# Patient Record
Sex: MALE | Race: WHITE | NOT HISPANIC OR LATINO | Employment: FULL TIME | URBAN - METROPOLITAN AREA
[De-identification: names, ages, dates, MRNs, and addresses within clinical notes are randomized per-mention and may not be internally consistent; named-entity substitution may affect disease eponyms.]

---

## 2017-08-11 RX ORDER — FEXOFENADINE HCL 180 MG/1
180 TABLET ORAL DAILY
COMMUNITY
End: 2020-06-16 | Stop reason: HOSPADM

## 2017-08-11 RX ORDER — IBUPROFEN 200 MG
200 TABLET ORAL EVERY 6 HOURS PRN
COMMUNITY

## 2017-08-11 RX ORDER — NAPROXEN 250 MG/1
250 TABLET ORAL 2 TIMES DAILY PRN
COMMUNITY
End: 2017-09-01

## 2017-08-11 RX ORDER — LISINOPRIL AND HYDROCHLOROTHIAZIDE 20; 12.5 MG/1; MG/1
1 TABLET ORAL DAILY
COMMUNITY
Start: 2014-11-07 | End: 2020-12-03 | Stop reason: DRUGHIGH

## 2017-08-11 RX ORDER — PANTOPRAZOLE SODIUM 40 MG/1
40 GRANULE, DELAYED RELEASE ORAL
COMMUNITY
Start: 2014-11-07 | End: 2022-07-18 | Stop reason: SDUPTHER

## 2017-08-11 RX ORDER — SIMVASTATIN 40 MG
40 TABLET ORAL DAILY
COMMUNITY

## 2017-08-11 RX ORDER — METHSCOPOLAMINE BROMIDE 5 MG/1
2.5 TABLET ORAL DAILY PRN
COMMUNITY
Start: 2014-11-07

## 2017-08-15 ENCOUNTER — OFFICE VISIT (OUTPATIENT)
Dept: NEUROSURGERY | Facility: CLINIC | Age: 52
End: 2017-08-15

## 2017-08-15 VITALS
BODY MASS INDEX: 35.16 KG/M2 | DIASTOLIC BLOOD PRESSURE: 73 MMHG | HEART RATE: 87 BPM | WEIGHT: 232 LBS | HEIGHT: 68 IN | SYSTOLIC BLOOD PRESSURE: 114 MMHG

## 2017-08-15 DIAGNOSIS — M54.50 LUMBAR SPINE PAIN: ICD-10-CM

## 2017-08-15 DIAGNOSIS — M54.16 BILATERAL LUMBAR RADICULOPATHY: Primary | ICD-10-CM

## 2017-08-15 PROCEDURE — 99213 OFFICE O/P EST LOW 20 MIN: CPT | Performed by: NURSE PRACTITIONER

## 2017-08-15 NOTE — PROGRESS NOTES
Subjective   Patient ID: Enrike Price is a 51 y.o. male is here today for follow-up bilateral leg pain. He states that the right side is worse than the left. Patient has been going to physical therapy once a week. He has just finished a MDP and states that it helped with the pain for a while. He is currently taking Advil 200 mg PRN and Naproxen 250 mg PRN for pain.    Leg Pain    The incident occurred more than 1 week ago. There was no injury mechanism. The pain is present in the left hip, left thigh, right hip, right thigh and right knee (right and left buttock and calf). The pain is at a severity of 6/10. The pain is moderate. The pain has been fluctuating since onset. Associated symptoms include numbness (intermittent) and tingling. Exacerbated by: walking, lying down. Treatments tried: PT, MDP. The treatment provided mild relief.       The following portions of the patient's history were reviewed and updated as appropriate: allergies, current medications, past family history, past medical history, past social history, past surgical history and problem list.    Review of Systems   Constitutional: Positive for activity change.   Musculoskeletal: Positive for arthralgias and back pain.   Neurological: Positive for tingling and numbness (intermittent).   All other systems reviewed and are negative.      Objective   Physical Exam   Constitutional: He is oriented to person, place, and time. He appears well-developed and well-nourished. He is cooperative. No distress.   HENT:   Head: Normocephalic and atraumatic.   Eyes: Conjunctivae and EOM are normal. Pupils are equal, round, and reactive to light.   Neck: Normal range of motion. Neck supple.   Cardiovascular: Normal rate and regular rhythm.    Pulmonary/Chest: Effort normal. No respiratory distress.   Abdominal: Soft. He exhibits no distension. There is no tenderness.   Musculoskeletal: Normal range of motion. He exhibits no edema or tenderness.   Neurological:  He is alert and oriented to person, place, and time. He has normal strength and normal reflexes. He displays no atrophy and normal reflexes. No sensory deficit. Coordination (mild difficulty with heel walkingon the right) abnormal. GCS eye subscore is 4. GCS verbal subscore is 5. GCS motor subscore is 6.   Reflex Scores:       Tricep reflexes are 2+ on the right side and 2+ on the left side.       Bicep reflexes are 2+ on the right side and 2+ on the left side.       Brachioradialis reflexes are 2+ on the right side and 2+ on the left side.       Patellar reflexes are 2+ on the right side and 2+ on the left side.       Achilles reflexes are 2+ on the right side and 2+ on the left side.  Negative Patel's; negative clonus.  Straight leg raise positive on the right at 30°.   Skin: Skin is warm and dry. No rash noted. He is not diaphoretic.   Psychiatric: He has a normal mood and affect. Thought content normal.   Vitals reviewed.    Neurologic Exam     Mental Status   Oriented to person, place, and time.     Cranial Nerves     CN III, IV, VI   Pupils are equal, round, and reactive to light.  Extraocular motions are normal.     Motor Exam     Strength   Strength 5/5 throughout.     Gait, Coordination, and Reflexes     Reflexes   Right brachioradialis: 2+  Left brachioradialis: 2+  Right biceps: 2+  Left biceps: 2+  Right triceps: 2+  Left triceps: 2+  Right patellar: 2+  Left patellar: 2+  Right achilles: 2+  Left achilles: 2+      Assessment/Plan   Independent Review of Radiographic Studies:      I reviewed an old MRI of the lumbar spine performed in November 2015 today in the office.  The MRI showed multilevel degenerative changes with a central disc osteophyte complex at L4-5 contributing to bilateral lateral recess narrowing.  There is also a mild central disc bulge at L5-S1.    Medical Decision Making:      Mr. Price returns to the office for the above complaints.  He is been noticing increasing bilateral low  back pain with right greater than left buttock pain and posterior thigh/calf pain.  He has tried a Medrol Dosepak and physical therapy with no relief in symptoms.    Given the history and the findings on a previous lumbar MRI performed nearly 2 years ago, I think a new lumbar MRI is warranted.   will return to the office thereafter for re-evaluation.      Enrike was seen today for leg pain.    Diagnoses and all orders for this visit:    Bilateral lumbar radiculopathy  -     MRI Lumbar Spine Without Contrast; Future    Lumbar spine pain  -     MRI Lumbar Spine Without Contrast; Future      Return for after radiographic imaging.

## 2017-08-24 ENCOUNTER — APPOINTMENT (OUTPATIENT)
Dept: MRI IMAGING | Facility: HOSPITAL | Age: 52
End: 2017-08-24

## 2017-08-29 ENCOUNTER — HOSPITAL ENCOUNTER (OUTPATIENT)
Dept: MRI IMAGING | Facility: HOSPITAL | Age: 52
Discharge: HOME | End: 2017-08-29
Admitting: NURSE PRACTITIONER

## 2017-08-29 DIAGNOSIS — M54.50 LUMBAR SPINE PAIN: ICD-10-CM

## 2017-08-29 DIAGNOSIS — M54.16 BILATERAL LUMBAR RADICULOPATHY: ICD-10-CM

## 2017-08-29 PROCEDURE — 72148 MRI LUMBAR SPINE W/O DYE: CPT

## 2017-08-31 ENCOUNTER — OFFICE VISIT (OUTPATIENT)
Dept: NEUROSURGERY | Facility: CLINIC | Age: 52
End: 2017-08-31

## 2017-08-31 VITALS
BODY MASS INDEX: 34.86 KG/M2 | WEIGHT: 230 LBS | DIASTOLIC BLOOD PRESSURE: 74 MMHG | HEART RATE: 99 BPM | HEIGHT: 68 IN | SYSTOLIC BLOOD PRESSURE: 132 MMHG

## 2017-08-31 DIAGNOSIS — M51.16 NEURITIS OR RADICULITIS DUE TO RUPTURE OF LUMBAR INTERVERTEBRAL DISC: Primary | ICD-10-CM

## 2017-08-31 PROCEDURE — 99213 OFFICE O/P EST LOW 20 MIN: CPT | Performed by: NEUROLOGICAL SURGERY

## 2017-08-31 RX ORDER — CEFAZOLIN SODIUM 2 G/100ML
2 INJECTION, SOLUTION INTRAVENOUS ONCE
Status: CANCELLED | OUTPATIENT
Start: 2017-09-13 | End: 2017-08-31

## 2017-09-01 ENCOUNTER — APPOINTMENT (OUTPATIENT)
Dept: PREADMISSION TESTING | Facility: HOSPITAL | Age: 52
End: 2017-09-01

## 2017-09-01 ENCOUNTER — TELEPHONE (OUTPATIENT)
Dept: NEUROSURGERY | Facility: CLINIC | Age: 52
End: 2017-09-01

## 2017-09-01 LAB
ANION GAP SERPL CALCULATED.3IONS-SCNC: 14.8 MMOL/L
BUN BLD-MCNC: 15 MG/DL (ref 6–20)
BUN/CREAT SERPL: 19 (ref 7–25)
CALCIUM SPEC-SCNC: 9.8 MG/DL (ref 8.6–10.5)
CHLORIDE SERPL-SCNC: 102 MMOL/L (ref 98–107)
CO2 SERPL-SCNC: 25.2 MMOL/L (ref 22–29)
CREAT BLD-MCNC: 0.79 MG/DL (ref 0.76–1.27)
DEPRECATED RDW RBC AUTO: 44.4 FL (ref 37–54)
ERYTHROCYTE [DISTWIDTH] IN BLOOD BY AUTOMATED COUNT: 13.6 % (ref 11.5–14.5)
GFR SERPL CREATININE-BSD FRML MDRD: 104 ML/MIN/1.73
GLUCOSE BLD-MCNC: 93 MG/DL (ref 65–99)
HCT VFR BLD AUTO: 43.8 % (ref 40.4–52.2)
HGB BLD-MCNC: 14.4 G/DL (ref 13.7–17.6)
MCH RBC QN AUTO: 29.9 PG (ref 27–32.7)
MCHC RBC AUTO-ENTMCNC: 32.9 G/DL (ref 32.6–36.4)
MCV RBC AUTO: 90.9 FL (ref 79.8–96.2)
PLATELET # BLD AUTO: 232 10*3/MM3 (ref 140–500)
PMV BLD AUTO: 10.6 FL (ref 6–12)
POTASSIUM BLD-SCNC: 4.2 MMOL/L (ref 3.5–5.2)
RBC # BLD AUTO: 4.82 10*6/MM3 (ref 4.6–6)
SODIUM BLD-SCNC: 142 MMOL/L (ref 136–145)
WBC NRBC COR # BLD: 6.96 10*3/MM3 (ref 4.5–10.7)

## 2017-09-01 PROCEDURE — 93010 ELECTROCARDIOGRAM REPORT: CPT | Performed by: INTERNAL MEDICINE

## 2017-09-01 PROCEDURE — 85027 COMPLETE CBC AUTOMATED: CPT | Performed by: NEUROLOGICAL SURGERY

## 2017-09-01 PROCEDURE — 93005 ELECTROCARDIOGRAM TRACING: CPT

## 2017-09-01 PROCEDURE — 36415 COLL VENOUS BLD VENIPUNCTURE: CPT

## 2017-09-01 PROCEDURE — 80048 BASIC METABOLIC PNL TOTAL CA: CPT | Performed by: NEUROLOGICAL SURGERY

## 2017-09-01 RX ORDER — METHYLPREDNISOLONE 4 MG/1
TABLET ORAL
Qty: 1 EACH | Refills: 0 | Status: ON HOLD | OUTPATIENT
Start: 2017-09-01 | End: 2017-09-13

## 2017-09-01 NOTE — DISCHARGE INSTRUCTIONS
Take the following medications the morning of surgery with a small sip of water:  NONE    ARRIVE TO MAIN OR AT 7:30      General Instructions:  • Do not eat solid food after midnight the night before surgery.  • You may drink clear liquids day of surgery but must stop at least one hour before your hospital arrival time.  • It is beneficial for you to have a clear drink that contains carbohydrates the day of surgery.  We suggest a 20 ounce bottle of Gatorade or Powerade for non-diabetic patients or a 20 ounce bottle of G2 or Powerade Zero for diabetic patients. (Pediatric patients, are not advised to drink a 20 ounce carbohydrate drink)    Clear liquids are liquids you can see through.  Nothing red in color.     Plain water                               Sports drinks  Sodas                                   Gelatin (Jell-O)  Fruit juices without pulp such as white grape juice and apple juice  Popsicles that contain no fruit or yogurt  Tea or coffee (no cream or milk added)  Gatorade / Powerade  G2 / Powerade Zero    • Infants may have breast milk up to four hours before surgery.  • Infants drinking formula may drink formula up to six hours before surgery.   • Patients who avoid smoking, chewing tobacco and alcohol for 4 weeks prior to surgery have a reduced risk of post-operative complications.  Quit smoking as many days before surgery as you can.  • Do not smoke, use chewing tobacco or drink alcohol the day of surgery.   • If applicable bring your C-PAP/ BI-PAP machine.  • Bring any papers given to you in the doctor’s office.  • Wear clean comfortable clothes and socks.  • Do not wear contact lenses or make-up.  Bring a case for your glasses.   • Bring crutches or walker if applicable.  • Leave all other valuables and jewelry at home.  • The Pre-Admission Testing nurse will instruct you to bring medications if unable to obtain an accurate list in Pre-Admission Testing.        If you were given a blood bank ID arm  band remember to bring it with you the day of surgery.    Preventing a Surgical Site Infection:  • For 2 to 3 days before surgery, avoid shaving with a razor because the razor can irritate skin and make it easier to develop an infection.  • The night prior to surgery sleep in a clean bed with clean clothing.  Do not allow pets to sleep with you.  • Shower on the morning of surgery using a fresh bar of anti-bacterial soap (such as Dial) and clean washcloth.  Dry with a clean towel and dress in clean clothing.  • Ask your surgeon if you will be receiving antibiotics prior to surgery.  • Make sure you, your family, and all healthcare providers clean their hands with soap and water or an alcohol based hand  before caring for you or your wound.    Day of surgery:  Upon arrival, a Pre-op nurse and Anesthesiologist will review your health history, obtain vital signs, and answer questions you may have.  The only belongings needed at this time will be your home medications and if applicable your C-PAP/BI-PAP machine.  If you are staying overnight your family can leave the rest of your belongings in the car and bring them to your room later.  A Pre-op nurse will start an IV and you may receive medication in preparation for surgery, including something to help you relax.  Your family will be able to see you in the Pre-op area.  While you are in surgery your family should notify the waiting room  if they leave the waiting room area and provide a contact phone number.    Please be aware that surgery does come with discomfort.  We want to make every effort to control your discomfort so please discuss any uncontrolled symptoms with your nurse.   Your doctor will most likely have prescribed pain medications.      If you are going home after surgery you will receive individualized written care instructions before being discharged.  A responsible adult must drive you to and from the hospital on the day of your  surgery and stay with you for 24 hours.    If you are staying overnight following surgery, you will be transported to your hospital room following the recovery period.  [facility] has all private rooms.    If you have any questions please call Pre-Admission Testing at [phone].  Deductibles and co-payments are collected on the day of service. Please be prepared to pay the required co-pay, deductible or deposit on the day of service as defined by your plan.

## 2017-09-01 NOTE — TELEPHONE ENCOUNTER
Patient called and wanted to know if he can get a MDP? He has some increased pain today. His surgery is scheduled for 9/11/2017

## 2017-09-13 ENCOUNTER — APPOINTMENT (OUTPATIENT)
Dept: GENERAL RADIOLOGY | Facility: HOSPITAL | Age: 52
End: 2017-09-13

## 2017-09-13 ENCOUNTER — ANESTHESIA (OUTPATIENT)
Dept: PERIOP | Facility: HOSPITAL | Age: 52
End: 2017-09-13

## 2017-09-13 ENCOUNTER — HOSPITAL ENCOUNTER (OUTPATIENT)
Facility: HOSPITAL | Age: 52
Setting detail: HOSPITAL OUTPATIENT SURGERY
Discharge: HOME | End: 2017-09-13
Attending: NEUROLOGICAL SURGERY | Admitting: NEUROLOGICAL SURGERY

## 2017-09-13 ENCOUNTER — ANESTHESIA EVENT (OUTPATIENT)
Dept: PERIOP | Facility: HOSPITAL | Age: 52
End: 2017-09-13

## 2017-09-13 VITALS
HEIGHT: 68 IN | DIASTOLIC BLOOD PRESSURE: 86 MMHG | BODY MASS INDEX: 35.54 KG/M2 | SYSTOLIC BLOOD PRESSURE: 120 MMHG | HEART RATE: 90 BPM | WEIGHT: 234.5 LBS | OXYGEN SATURATION: 95 % | RESPIRATION RATE: 18 BRPM | TEMPERATURE: 98.7 F

## 2017-09-13 DIAGNOSIS — M51.16 NEURITIS OR RADICULITIS DUE TO RUPTURE OF LUMBAR INTERVERTEBRAL DISC: ICD-10-CM

## 2017-09-13 PROCEDURE — 25010000003 CEFAZOLIN IN DEXTROSE 2-4 GM/100ML-% SOLUTION: Performed by: NEUROLOGICAL SURGERY

## 2017-09-13 PROCEDURE — 76000 FLUOROSCOPY <1 HR PHYS/QHP: CPT

## 2017-09-13 PROCEDURE — 63030 LAMOT DCMPRN NRV RT 1 LMBR: CPT | Performed by: NEUROLOGICAL SURGERY

## 2017-09-13 PROCEDURE — 25010000002 FENTANYL CITRATE (PF) 100 MCG/2ML SOLUTION: Performed by: NURSE ANESTHETIST, CERTIFIED REGISTERED

## 2017-09-13 PROCEDURE — 25010000002 HYDROMORPHONE PER 4 MG: Performed by: NURSE ANESTHETIST, CERTIFIED REGISTERED

## 2017-09-13 PROCEDURE — 25010000003 CEFAZOLIN IN D5W 1 GM/50ML SOLUTION: Performed by: NURSE ANESTHETIST, CERTIFIED REGISTERED

## 2017-09-13 PROCEDURE — 25010000002 ONDANSETRON PER 1 MG: Performed by: NURSE ANESTHETIST, CERTIFIED REGISTERED

## 2017-09-13 PROCEDURE — 25010000002 MIDAZOLAM PER 1 MG: Performed by: ANESTHESIOLOGY

## 2017-09-13 PROCEDURE — 25010000002 PROPOFOL 10 MG/ML EMULSION: Performed by: NURSE ANESTHETIST, CERTIFIED REGISTERED

## 2017-09-13 PROCEDURE — 72100 X-RAY EXAM L-S SPINE 2/3 VWS: CPT

## 2017-09-13 PROCEDURE — 25010000002 DEXAMETHASONE PER 1 MG: Performed by: NURSE ANESTHETIST, CERTIFIED REGISTERED

## 2017-09-13 RX ORDER — EPHEDRINE SULFATE 50 MG/ML
5 INJECTION, SOLUTION INTRAVENOUS ONCE AS NEEDED
Status: DISCONTINUED | OUTPATIENT
Start: 2017-09-13 | End: 2017-09-13 | Stop reason: HOSPADM

## 2017-09-13 RX ORDER — MIDAZOLAM HYDROCHLORIDE 1 MG/ML
1 INJECTION INTRAMUSCULAR; INTRAVENOUS
Status: DISCONTINUED | OUTPATIENT
Start: 2017-09-13 | End: 2017-09-13 | Stop reason: HOSPADM

## 2017-09-13 RX ORDER — DIPHENHYDRAMINE HYDROCHLORIDE 50 MG/ML
12.5 INJECTION INTRAMUSCULAR; INTRAVENOUS
Status: DISCONTINUED | OUTPATIENT
Start: 2017-09-13 | End: 2017-09-13 | Stop reason: HOSPADM

## 2017-09-13 RX ORDER — PROMETHAZINE HYDROCHLORIDE 25 MG/1
25 TABLET ORAL ONCE AS NEEDED
Status: DISCONTINUED | OUTPATIENT
Start: 2017-09-13 | End: 2017-09-13 | Stop reason: HOSPADM

## 2017-09-13 RX ORDER — DEXAMETHASONE SODIUM PHOSPHATE 10 MG/ML
INJECTION INTRAMUSCULAR; INTRAVENOUS AS NEEDED
Status: DISCONTINUED | OUTPATIENT
Start: 2017-09-13 | End: 2017-09-13 | Stop reason: SURG

## 2017-09-13 RX ORDER — PROMETHAZINE HYDROCHLORIDE 25 MG/1
25 SUPPOSITORY RECTAL ONCE AS NEEDED
Status: DISCONTINUED | OUTPATIENT
Start: 2017-09-13 | End: 2017-09-13 | Stop reason: HOSPADM

## 2017-09-13 RX ORDER — PROMETHAZINE HYDROCHLORIDE 25 MG/ML
12.5 INJECTION, SOLUTION INTRAMUSCULAR; INTRAVENOUS ONCE AS NEEDED
Status: DISCONTINUED | OUTPATIENT
Start: 2017-09-13 | End: 2017-09-13 | Stop reason: HOSPADM

## 2017-09-13 RX ORDER — FAMOTIDINE 10 MG/ML
20 INJECTION, SOLUTION INTRAVENOUS ONCE
Status: COMPLETED | OUTPATIENT
Start: 2017-09-13 | End: 2017-09-13

## 2017-09-13 RX ORDER — HYDROMORPHONE HYDROCHLORIDE 1 MG/ML
0.5 INJECTION, SOLUTION INTRAMUSCULAR; INTRAVENOUS; SUBCUTANEOUS
Status: DISCONTINUED | OUTPATIENT
Start: 2017-09-13 | End: 2017-09-13 | Stop reason: HOSPADM

## 2017-09-13 RX ORDER — PROPOFOL 10 MG/ML
VIAL (ML) INTRAVENOUS AS NEEDED
Status: DISCONTINUED | OUTPATIENT
Start: 2017-09-13 | End: 2017-09-13 | Stop reason: SURG

## 2017-09-13 RX ORDER — OXYCODONE AND ACETAMINOPHEN 7.5; 325 MG/1; MG/1
1 TABLET ORAL ONCE AS NEEDED
Status: DISCONTINUED | OUTPATIENT
Start: 2017-09-13 | End: 2017-09-13 | Stop reason: HOSPADM

## 2017-09-13 RX ORDER — HYDROCODONE BITARTRATE AND ACETAMINOPHEN 5; 325 MG/1; MG/1
1 TABLET ORAL EVERY 4 HOURS PRN
Qty: 35 TABLET | Refills: 0
Start: 2017-09-13 | End: 2017-09-28 | Stop reason: SDUPTHER

## 2017-09-13 RX ORDER — LIDOCAINE HYDROCHLORIDE 20 MG/ML
INJECTION, SOLUTION INFILTRATION; PERINEURAL AS NEEDED
Status: DISCONTINUED | OUTPATIENT
Start: 2017-09-13 | End: 2017-09-13 | Stop reason: SURG

## 2017-09-13 RX ORDER — MIDAZOLAM HYDROCHLORIDE 1 MG/ML
2 INJECTION INTRAMUSCULAR; INTRAVENOUS
Status: DISCONTINUED | OUTPATIENT
Start: 2017-09-13 | End: 2017-09-13 | Stop reason: HOSPADM

## 2017-09-13 RX ORDER — HYDROCODONE BITARTRATE AND ACETAMINOPHEN 7.5; 325 MG/1; MG/1
1 TABLET ORAL ONCE AS NEEDED
Status: COMPLETED | OUTPATIENT
Start: 2017-09-13 | End: 2017-09-13

## 2017-09-13 RX ORDER — ONDANSETRON 2 MG/ML
4 INJECTION INTRAMUSCULAR; INTRAVENOUS ONCE AS NEEDED
Status: DISCONTINUED | OUTPATIENT
Start: 2017-09-13 | End: 2017-09-13 | Stop reason: HOSPADM

## 2017-09-13 RX ORDER — PROMETHAZINE HYDROCHLORIDE 25 MG/1
12.5 TABLET ORAL ONCE AS NEEDED
Status: DISCONTINUED | OUTPATIENT
Start: 2017-09-13 | End: 2017-09-13 | Stop reason: HOSPADM

## 2017-09-13 RX ORDER — CEFAZOLIN SODIUM 2 G/100ML
2 INJECTION, SOLUTION INTRAVENOUS ONCE
Status: COMPLETED | OUTPATIENT
Start: 2017-09-13 | End: 2017-09-13

## 2017-09-13 RX ORDER — SCOLOPAMINE TRANSDERMAL SYSTEM 1 MG/1
1 PATCH, EXTENDED RELEASE TRANSDERMAL ONCE
Status: DISCONTINUED | OUTPATIENT
Start: 2017-09-13 | End: 2017-09-13 | Stop reason: HOSPADM

## 2017-09-13 RX ORDER — LABETALOL HYDROCHLORIDE 5 MG/ML
5 INJECTION, SOLUTION INTRAVENOUS
Status: DISCONTINUED | OUTPATIENT
Start: 2017-09-13 | End: 2017-09-13 | Stop reason: HOSPADM

## 2017-09-13 RX ORDER — HYDROCODONE BITARTRATE AND ACETAMINOPHEN 5; 325 MG/1; MG/1
1 TABLET ORAL ONCE AS NEEDED
Status: DISCONTINUED | OUTPATIENT
Start: 2017-09-13 | End: 2017-09-13 | Stop reason: HOSPADM

## 2017-09-13 RX ORDER — SODIUM CHLORIDE 0.9 % (FLUSH) 0.9 %
1-10 SYRINGE (ML) INJECTION AS NEEDED
Status: DISCONTINUED | OUTPATIENT
Start: 2017-09-13 | End: 2017-09-13 | Stop reason: HOSPADM

## 2017-09-13 RX ORDER — FENTANYL CITRATE 50 UG/ML
50 INJECTION, SOLUTION INTRAMUSCULAR; INTRAVENOUS
Status: DISCONTINUED | OUTPATIENT
Start: 2017-09-13 | End: 2017-09-13 | Stop reason: HOSPADM

## 2017-09-13 RX ORDER — FENTANYL CITRATE 50 UG/ML
INJECTION, SOLUTION INTRAMUSCULAR; INTRAVENOUS AS NEEDED
Status: DISCONTINUED | OUTPATIENT
Start: 2017-09-13 | End: 2017-09-13 | Stop reason: SURG

## 2017-09-13 RX ORDER — ROCURONIUM BROMIDE 10 MG/ML
INJECTION, SOLUTION INTRAVENOUS AS NEEDED
Status: DISCONTINUED | OUTPATIENT
Start: 2017-09-13 | End: 2017-09-13 | Stop reason: SURG

## 2017-09-13 RX ORDER — SODIUM CHLORIDE, SODIUM LACTATE, POTASSIUM CHLORIDE, CALCIUM CHLORIDE 600; 310; 30; 20 MG/100ML; MG/100ML; MG/100ML; MG/100ML
9 INJECTION, SOLUTION INTRAVENOUS CONTINUOUS
Status: DISCONTINUED | OUTPATIENT
Start: 2017-09-13 | End: 2017-09-13 | Stop reason: HOSPADM

## 2017-09-13 RX ORDER — ONDANSETRON 2 MG/ML
INJECTION INTRAMUSCULAR; INTRAVENOUS AS NEEDED
Status: DISCONTINUED | OUTPATIENT
Start: 2017-09-13 | End: 2017-09-13 | Stop reason: SURG

## 2017-09-13 RX ADMIN — CEFAZOLIN SODIUM 2 G: 2 INJECTION, SOLUTION INTRAVENOUS at 08:15

## 2017-09-13 RX ADMIN — LIDOCAINE HYDROCHLORIDE 60 MG: 20 INJECTION, SOLUTION INFILTRATION; PERINEURAL at 08:08

## 2017-09-13 RX ADMIN — FENTANYL CITRATE 100 MCG: 50 INJECTION INTRAMUSCULAR; INTRAVENOUS at 08:08

## 2017-09-13 RX ADMIN — SUGAMMADEX 200 MG: 100 INJECTION, SOLUTION INTRAVENOUS at 09:36

## 2017-09-13 RX ADMIN — ONDANSETRON 4 MG: 2 INJECTION INTRAMUSCULAR; INTRAVENOUS at 08:37

## 2017-09-13 RX ADMIN — SODIUM CHLORIDE, POTASSIUM CHLORIDE, SODIUM LACTATE AND CALCIUM CHLORIDE 9 ML/HR: 600; 310; 30; 20 INJECTION, SOLUTION INTRAVENOUS at 07:01

## 2017-09-13 RX ADMIN — HYDROMORPHONE HYDROCHLORIDE 0.5 MG: 1 INJECTION, SOLUTION INTRAMUSCULAR; INTRAVENOUS; SUBCUTANEOUS at 10:53

## 2017-09-13 RX ADMIN — FENTANYL CITRATE 50 MCG: 50 INJECTION INTRAMUSCULAR; INTRAVENOUS at 08:20

## 2017-09-13 RX ADMIN — FENTANYL CITRATE 50 MCG: 50 INJECTION INTRAMUSCULAR; INTRAVENOUS at 10:35

## 2017-09-13 RX ADMIN — MIDAZOLAM 2 MG: 1 INJECTION INTRAMUSCULAR; INTRAVENOUS at 07:02

## 2017-09-13 RX ADMIN — FENTANYL CITRATE 50 MCG: 50 INJECTION INTRAMUSCULAR; INTRAVENOUS at 09:44

## 2017-09-13 RX ADMIN — DEXAMETHASONE SODIUM PHOSPHATE 8 MG: 10 INJECTION INTRAMUSCULAR; INTRAVENOUS at 08:36

## 2017-09-13 RX ADMIN — CEFAZOLIN SODIUM 1 G: 1 INJECTION, SOLUTION INTRAVENOUS at 09:20

## 2017-09-13 RX ADMIN — HYDROMORPHONE HYDROCHLORIDE 0.5 MG: 1 INJECTION, SOLUTION INTRAMUSCULAR; INTRAVENOUS; SUBCUTANEOUS at 10:20

## 2017-09-13 RX ADMIN — SODIUM CHLORIDE, POTASSIUM CHLORIDE, SODIUM LACTATE AND CALCIUM CHLORIDE: 600; 310; 30; 20 INJECTION, SOLUTION INTRAVENOUS at 09:40

## 2017-09-13 RX ADMIN — PROPOFOL 150 MG: 10 INJECTION, EMULSION INTRAVENOUS at 08:08

## 2017-09-13 RX ADMIN — HYDROCODONE BITARTRATE AND ACETAMINOPHEN 1 TABLET: 7.5; 325 TABLET ORAL at 11:00

## 2017-09-13 RX ADMIN — ROCURONIUM BROMIDE 50 MG: 10 INJECTION INTRAVENOUS at 08:08

## 2017-09-13 RX ADMIN — FAMOTIDINE 20 MG: 10 INJECTION, SOLUTION INTRAVENOUS at 07:01

## 2017-09-13 RX ADMIN — FENTANYL CITRATE 50 MCG: 50 INJECTION INTRAMUSCULAR; INTRAVENOUS at 10:13

## 2017-09-13 RX ADMIN — SCOPALAMINE 1 PATCH: 1 PATCH, EXTENDED RELEASE TRANSDERMAL at 07:01

## 2017-09-13 NOTE — ANESTHESIA PREPROCEDURE EVALUATION
Anesthesia Evaluation     Patient summary reviewed and Nursing notes reviewed   history of anesthetic complications: PONV  NPO Solid Status: > 8 hours  NPO Liquid Status: > 2 hours     Airway   Mallampati: III  no difficulty expected  Dental - normal exam     Comment: Upper 2 caps    Pulmonary - normal exam   Cardiovascular - normal exam    (+) hypertension,       Neuro/Psych  GI/Hepatic/Renal/Endo      Musculoskeletal     (+) back pain,   Abdominal    Substance History      OB/GYN          Other                                        Anesthesia Plan    ASA 2     general   (Risks of dental trauma discussed)  Anesthetic plan and risks discussed with patient and father.

## 2017-09-13 NOTE — INTERVAL H&P NOTE
H&P updated. The patient was examined and the following changes are noted:  He has been having some numbness in the left thigh recently but not severe.  I rec that we just stick with the plan.  Anything else will involve a much bigger surgery.

## 2017-09-13 NOTE — PLAN OF CARE
Problem: Perioperative Period (Adult)  Goal: Signs and Symptoms of Listed Potential Problems Will be Absent or Manageable (Perioperative Period)  Outcome: Outcome(s) achieved Date Met:  09/13/17 09/13/17 4756   Perioperative Period   Problems Present (Perioperative Period) none

## 2017-09-13 NOTE — PLAN OF CARE
Problem: Patient Care Overview (Adult)  Goal: Adult Individualization and Mutuality  Outcome: Outcome(s) achieved Date Met:  09/13/17

## 2017-09-13 NOTE — ANESTHESIA PROCEDURE NOTES
Airway  Urgency: elective    Airway not difficult    General Information and Staff    Patient location during procedure: OR  CRNA: MARIAN MACEDO    Indications and Patient Condition  Indications for airway management: airway protection    Preoxygenated: yes  Mask difficulty assessment: 1 - vent by mask    Final Airway Details  Final airway type: endotracheal airway      Successful airway: ETT  Cuffed: yes   Successful intubation technique: direct laryngoscopy  Facilitating devices/methods: anterior pressure/BURP  Endotracheal tube insertion site: oral  Blade: Mila  Blade size: #3  ETT size: 7.5 mm  Cormack-Lehane Classification: grade IIb - view of arytenoids or posterior of glottis only  Placement verified by: chest auscultation and capnometry   Measured from: lips  ETT to lips (cm): 22  Number of attempts at approach: 1    Additional Comments   ett cuff up at MOP

## 2017-09-13 NOTE — DISCHARGE INSTRUCTIONS
Remove the Scopolamine patch from behind your ear tomorrow.      You had one Norco for pain at 11:00 AM.      LUMBAR SURGERY - DAYRON RIVERA M.D.  [address], [address]  [place] [address]  [phone]    Instructions & Care After Your Lumbar Surgery    1. No sitting except on the commode.  You may lie on a firm couch but not on a waterbed or recliner.  You may lie in any position that is comfortable, using only one pillow under your head. Either stand at a counter or lie on your side for meals. Three weeks after surgery you may begin sitting for 30 minutes 3 times per day.    2. No driving for three weeks.  You may ride in the car in a passenger seat that reclines or lying down in the back seat.      3. No bending. If you drop something allow someone else to pick it up.    4. Don’t lift anything heavier than a coffee cup or paperback book.    5. Gradually increase your activity each day.  You should get out of bed every hour during the day.  Walk outside as soon as you feel up to it.  Walk short distances frequently rather than making a long trip.  Frequency is more important than distance.  Your goal is to be walking 2 to 3 miles per day when you return for your post-operative visit. (Never do this in one trip.)    6. You may climb stairs.    7. Remove your bandage the second day after surgery and leave it off.  If you notice any redness, swelling or drainage, call the office.  There are steri-strips across the incision.  If these are still present ten days after surgery, remove them gently.      8. You may shower five days after surgery.  Keep the incision dry until then.  Don’t let the water beat directly on the incision and gently pat it dry.    9. Physical Therapy will be arranged at your post-operative visit if needed.    10. Your prescription for pain medication may be filled for half the original amount prior to your return office visit.  Due to changes in Federal Law in order to have this  medication refilled you must contact the office four days prior to the date and make arrangements to pick the prescription up in the office.  This prescription refill cannot be called in to the pharmacy. Your prescription will be ready for pick-up the day the refill is due.    Don’t be alarmed if you experience some of your pre-operative symptoms after going home.  This is not uncommon and normally goes away in a few days but may last longer.  If you have any questions or concerns, please call our office.

## 2017-09-13 NOTE — PLAN OF CARE
Problem: Patient Care Overview (Adult)  Goal: Plan of Care Review  Outcome: Outcome(s) achieved Date Met:  09/13/17 09/13/17 8822   Patient Care Overview   Progress improving   Outcome Evaluation   Outcome Summary/Follow up Plan READY FOR DISCHARGE

## 2017-09-13 NOTE — OP NOTE
Preop diagnosis: Herniated disc on the right at L3 4    Postop diagnosis: same    Procedure performed:  L3 4 laminectomy, foraminotomy, medial facetectomy and discectomy using minimal access spinal technologies microsurgical technique microsurgical instrumentation    Surgeon: Eric Dickson M.D.    Assistant: Estefani Carrillo CFA who was instrumental in helping with visualization of neural structures, hemostasis, and retraction of neural structures.    Indications for the procedure:  This is a patient with severe pain in the legs.  Previous imaging had shown neural compression at the L3 4 levels.  As a result of this and the failure of conservative therapy the patient elected to proceed with surgery.    Operative summary:  After induction of general anesthesia the patient was intubated and placed on the operating table in the prone position on a Shaq table.  All pressure points were padded including peripheral points of entrapment.  The back was prepped with Chloraprep and then draped with Ioban, half sheets, and a split sheet.      The L3 4 level was localized with intraoperative fluoroscopy an incision was made on the right just above the pedicle.  Successive dilating tubes up to 18 mm by 6 cm were placed over that area.  Soft tissue was then removed from the supralaminar space.  The inferior laminar arch of L3 as well as the superior laminar arch of L4 and the medial aspect of facet were removed with the [employer] drill.  The remainder of the operation was done under high-power magnification of the operating microscope using microsurgical technique and microsurgical instrumentation.  The ligamentum flavum was opened and removed out to the level of the pedicle using the Kerrison rongeurs.  This exposed the lateral thecal sac and the nerve root of L4.  Once the lateral recess was opened the dissection was carried up to the L3 pedicle completely decompressing the superior nerve root.    Once this was done the L4  nerve root was mobilized medially to expose the disc.  There was evidence of a large disc herniation compressing the nerve just under the nerve root.  This was carefully teased out and then the disc space was incised and disc material was removed with the down-biting cervical curettes and the pituitary rongeurs.  Was also some disc material that had herniated superiorly and was compressing the L3 nerve root in the foramen which was also removed.  Once the disc space was emptied as much as possible bleeding was controlled with bipolar cautery.    Once the entire decompression was completed we were able to explore under the nerve root and the thecal sac using the Chin ball probe to be sure there was no evidence of residual compression.there being none bleeding was controlled again using the bipolar cautery, FloSeal, and thrombin and Gelfoam.  The area was then copiously irrigated with bacitracin solution and the tube was removed. The paraspinous musculature was injected with 100 cc 1/8% Marcaine with 1:200,000 epinephrine solution.    Another gram of Kefzol was given prior to closure.    The incision was then closed in layersdressed and the patient was taken to the recovery room in stable condition there were no apparent complications.  Sponge, instrument, and needle counts were correct at the end of the procedure.

## 2017-09-13 NOTE — PLAN OF CARE
Problem: Patient Care Overview (Adult)  Goal: Plan of Care Review  Outcome: Ongoing (interventions implemented as appropriate)    09/13/17 0637   Coping/Psychosocial Response Interventions   Plan Of Care Reviewed With patient   Patient Care Overview   Progress no change       Goal: Adult Individualization and Mutuality  Outcome: Ongoing (interventions implemented as appropriate)    09/13/17 0637   Individualization   Patient Specific Preferences call Mark   Patient Specific Goals be able to move and walk without pain   Patient Specific Interventions teach S&S of infection   Mutuality/Individual Preferences   What Anxieties, Fears or Concerns Do You Have About Your Health or Care? none   What Questions Do You Have About Your Health or Care? none   What Information Would Help Us Give You More Personalized Care? none       Goal: Discharge Needs Assessment  Outcome: Ongoing (interventions implemented as appropriate)    09/13/17 0631 09/13/17 0637   Discharge Needs Assessment   Concerns To Be Addressed --  denies needs/concerns at this time   Readmission Within The Last 30 Days --  no previous admission in last 30 days   Current Health   Anticipated Changes Related to Illness --  none   Self-Care   Equipment Currently Used at Home --  none   Living Environment   Transportation Available car;family or friend will provide --          Problem: Perioperative Period (Adult)  Goal: Signs and Symptoms of Listed Potential Problems Will be Absent or Manageable (Perioperative Period)  Outcome: Ongoing (interventions implemented as appropriate)    09/13/17 0637   Perioperative Period   Problems Assessed (Perioperative Period) pain;hypoxia/hypoxemia;situational response   Problems Present (Perioperative Period) none

## 2017-09-13 NOTE — H&P (VIEW-ONLY)
Subjective   Patient ID: Enrike Price is a 51 y.o. male is here today for follow-up with new Lumbar MRI ordered for back pain bilateral leg pain, with numbness and tingling in lower extremity's.    History of Present Illness    This patient returns today.  He has pain in his lower back with radiation into his right buttock and primarily into his right anterior thigh.  He does not have much pain on the left side at all.  He has no difficulty with bowel or bladder control or other associated symptoms.  He has no numbness or tingling in his perineum.    The following portions of the patient's history were reviewed and updated as appropriate: allergies, current medications, past family history, past medical history, past social history, past surgical history and problem list.    Review of Systems   Respiratory: Negative for chest tightness and shortness of breath.    Cardiovascular: Negative for chest pain.   Musculoskeletal: Positive for back pain.        Bilateral leg pain   Neurological: Positive for numbness.        Tingling   All other systems reviewed and are negative.      Objective   Physical Exam   Constitutional: He is oriented to person, place, and time. He appears well-developed and well-nourished.   Eyes: EOM are normal. Pupils are equal, round, and reactive to light.   Neurological: He is oriented to person, place, and time. He has a normal Finger-Nose-Finger Test and a normal Heel to Shin Test. Gait normal.   Reflex Scores:       Tricep reflexes are 2+ on the right side and 2+ on the left side.       Bicep reflexes are 2+ on the right side and 2+ on the left side.       Brachioradialis reflexes are 2+ on the right side and 2+ on the left side.       Patellar reflexes are 2+ on the right side and 2+ on the left side.       Achilles reflexes are 2+ on the right side and 2+ on the left side.  Psychiatric: His speech is normal.     Neurologic Exam     Mental Status   Oriented to person, place, and time.    Registration of memory: Good recent and remote memory.   Attention: normal. Concentration: normal.   Speech: speech is normal   Level of consciousness: alert  Knowledge: consistent with education.     Cranial Nerves     CN II   Visual fields full to confrontation.   Visual acuity: normal    CN III, IV, VI   Pupils are equal, round, and reactive to light.  Extraocular motions are normal.     CN V   Facial sensation intact.   Right corneal reflex: normal  Left corneal reflex: normal    CN VII   Facial expression full, symmetric.   Right facial weakness: none  Left facial weakness: none    CN VIII   Hearing: intact    CN IX, X   Palate: symmetric    CN XI   Right sternocleidomastoid strength: normal  Left sternocleidomastoid strength: normal    CN XII   Tongue: not atrophic  Tongue deviation: none    Motor Exam   Muscle bulk: normal  Right arm tone: normal  Left arm tone: normal  Right leg tone: normal  Left leg tone: normal    Strength   Strength 5/5 except as noted.     Sensory Exam   Light touch normal.     Gait, Coordination, and Reflexes     Gait  Gait: normal    Coordination   Finger to nose coordination: normal  Heel to shin coordination: normal    Reflexes   Right brachioradialis: 2+  Left brachioradialis: 2+  Right biceps: 2+  Left biceps: 2+  Right triceps: 2+  Left triceps: 2+  Right patellar: 2+  Left patellar: 2+  Right achilles: 2+  Left achilles: 2+  Right : 2+  Left : 2+      Assessment/Plan   Independent Review of Radiographic Studies:      I reviewed an MRI done on August 29 myself.  This does show a disc herniation to the right side into the neural foramen at L2-3.  It is probably causing some pressure on the nerve at that level.  There is a somewhat larger disc herniation to the right side and centrally at L3 4.  L4 5 shows some foraminal stenosis that is degenerative in nature and L5-S1 for the most part looks okay.    Medical Decision Making:      I told the patient and his wife about  the images.  I also showed him the films.  I told him that we could try epidural blocks or we could proceed with surgery.  I explained the advantages and disadvantages of each.  I told the patient about the risks, complications and expected outcome of the lumbar surgery.  I explained that there was an 80% chance of getting rid of the pain in the leg.  I explained that there would still be back pain after the surgery.  Initially this will be quite severe but will improve over time.  There is a 2 or 3% chance of infection, bleeding, CSF leak, damage to the nerve as a result of surgery, paralysis, as well as anesthetic risk.  There is a 5% chance of late instability which could require a fusion later on and a 10% chance of recurrent disc herniation.  We discussed the postoperative hospital and home course.    If he decides to proceed he will need to be scheduled for a: Right L3 4 laminectomy and discectomy with Metrix    Enrike was seen today for back pain and leg pain.    Diagnoses and all orders for this visit:    Neuritis or radiculitis due to rupture of lumbar intervertebral disc  -     Case Request; Standing  -     ceFAZolin (ANCEF) 2 g in sodium chloride 0.9 % 100 mL IVPB; Infuse 2 g into a venous catheter 1 (One) Time.  -     Case Request    Other orders  -     Follow anesthesia standing orders.  -     Obtain informed consent  -     Follow anesthesia standing orders.; Standing  -     LUIS hose- To be placed on patient in pre-op; Standing  -     SCD (sequential compression device)- to be placed on patient in Pre-op; Standing    Return for 2-3 week post op.

## 2017-09-13 NOTE — PLAN OF CARE
Problem: Patient Care Overview (Adult)  Goal: Discharge Needs Assessment  Outcome: Outcome(s) achieved Date Met:  09/13/17

## 2017-09-13 NOTE — BRIEF OP NOTE
LUMBAR DISCECTOMY POSTERIOR WITH METRIX  Procedure Note    Enrike Price  9/13/2017    Pre-op Diagnosis:   Neuritis or radiculitis due to rupture of lumbar intervertebral disc [M51.16]    Post-op Diagnosis:     Post-Op Diagnosis Codes:     * Neuritis or radiculitis due to rupture of lumbar intervertebral disc [M51.16]    Procedure/CPT® Codes:      Procedure(s):  Right L3 4 laminectomy and discectomy with Metrix    Surgeon(s):  Eric Dickson MD    Anesthesia: General    Staff:   Circulator: Roxanna Stovall RN; Rita Sanders RN  Scrub Person: Last Ayala  Assistant: Estefani Carrillo CSA    Estimated Blood Loss: 100 cc  Urine Voided: * No values recorded between 9/13/2017  7:55 AM and 9/13/2017  9:32 AM *    Specimens:                none      Drains:           Findings: large HNP    Complications: none      Eric Dickson MD     Date: 9/13/2017  Time: 9:32 AM

## 2017-09-13 NOTE — ANESTHESIA POSTPROCEDURE EVALUATION
"Patient: Enrike Price    Procedure Summary     Date Anesthesia Start Anesthesia Stop Room / Location    09/13/17 0800 [id number]  [facility] / [facility]       Procedure Diagnosis Surgeon Provider    Right L3 4 laminectomy and discectomy with Metrix (Right Spine Lumbar) Neuritis or radiculitis due to rupture of lumbar intervertebral disc  (Neuritis or radiculitis due to rupture of lumbar intervertebral disc [M51.16]) MD Radha Kruger MD          Anesthesia Type: general  Last vitals  BP   120/86 (09/13/17 1300)    Temp        Pulse   90 (09/13/17 1300)   Resp   18 (09/13/17 1300)    SpO2   95 % (09/13/17 1300)      Post Anesthesia Care and Evaluation    Patient location during evaluation: bedside  Patient participation: complete - patient participated  Level of consciousness: awake and alert  Pain management: adequate  Airway patency: patent  Anesthetic complications: No anesthetic complications    Cardiovascular status: acceptable  Respiratory status: acceptable  Hydration status: acceptable    Comments: /86  Pulse 90  Temp 37.1 °C (98.7 °F) (Oral)   Resp 18  Ht 68\" (172.7 cm)  Wt 234 lb 8 oz (106 kg)  SpO2 95%  BMI 35.66 kg/m2      "

## 2017-09-15 ENCOUNTER — TELEPHONE (OUTPATIENT)
Dept: NEUROSURGERY | Facility: CLINIC | Age: 52
End: 2017-09-15

## 2017-09-15 RX ORDER — CYCLOBENZAPRINE HCL 10 MG
10 TABLET ORAL 3 TIMES DAILY PRN
Qty: 40 TABLET | Refills: 0 | Status: SHIPPED | OUTPATIENT
Start: 2017-09-15 | End: 2017-11-07 | Stop reason: HOSPADM

## 2017-09-15 NOTE — TELEPHONE ENCOUNTER
Judith-      This pt called tonight with back pain. I told him he could double up on his norco Just for tonight. Please call in flexeril to his pharmacy tomorrow abs check in with him. He is aware he will not get a refill for the norco any earlier.   Leslie Abbott      Called the patient to advise him of the above from Leslie and to check on him. I told him that I sent the flexeril to his pharmacy. He said his left foot is slightly weaker than the right however the severe leg pain that he had before is gone he just has some slight numbness in his thigh. He also has some increased back pain. He said it is harder to get out of bed but once he is up he feels ok. I told him his back pain sounds ok and that he could have some increased pain than before surgery. He said he understands that it is a different pain and it should pass. He will walk as much as he can tolerate. He will call the office with any further problems or concerns

## 2017-09-19 ENCOUNTER — TELEPHONE (OUTPATIENT)
Dept: NEUROSURGERY | Facility: CLINIC | Age: 52
End: 2017-09-19

## 2017-09-25 ENCOUNTER — TELEPHONE (OUTPATIENT)
Dept: NEUROSURGERY | Facility: CLINIC | Age: 52
End: 2017-09-25

## 2017-09-25 DIAGNOSIS — Z09 FOLLOW-UP EXAMINATION FOLLOWING SURGERY: ICD-10-CM

## 2017-09-25 DIAGNOSIS — M51.16 NEURITIS OR RADICULITIS DUE TO RUPTURE OF LUMBAR INTERVERTEBRAL DISC: Primary | ICD-10-CM

## 2017-09-25 NOTE — TELEPHONE ENCOUNTER
"Pt called today regarding his pain. He states that he is having a \"shock feeling or spasms' that started after surgery and hasn't gone away. He also states that he has on rt side of the incision he had a constant ache. He wants to know if it Is normal and can we do something for him? He can be reached at the number listed.  "

## 2017-09-26 ENCOUNTER — HOSPITAL ENCOUNTER (OUTPATIENT)
Dept: GENERAL RADIOLOGY | Facility: HOSPITAL | Age: 52
Discharge: HOME | End: 2017-09-26
Attending: NEUROLOGICAL SURGERY | Admitting: NEUROLOGICAL SURGERY

## 2017-09-26 DIAGNOSIS — Z09 FOLLOW-UP EXAMINATION FOLLOWING SURGERY: ICD-10-CM

## 2017-09-26 DIAGNOSIS — M51.16 NEURITIS OR RADICULITIS DUE TO RUPTURE OF LUMBAR INTERVERTEBRAL DISC: ICD-10-CM

## 2017-09-26 PROCEDURE — 72114 X-RAY EXAM L-S SPINE BENDING: CPT

## 2017-09-26 RX ORDER — METHYLPREDNISOLONE 4 MG/1
TABLET ORAL
Qty: 1 EACH | Refills: 0 | Status: SHIPPED | OUTPATIENT
Start: 2017-09-26 | End: 2017-10-20

## 2017-09-26 NOTE — TELEPHONE ENCOUNTER
Correction to Dr. Dickson's note do lumbar XR, as the patient was an L3-4 Laminectomy. Patient is aware and will get the XR's

## 2017-09-28 ENCOUNTER — OFFICE VISIT (OUTPATIENT)
Dept: NEUROSURGERY | Facility: CLINIC | Age: 52
End: 2017-09-28

## 2017-09-28 VITALS
SYSTOLIC BLOOD PRESSURE: 120 MMHG | HEART RATE: 117 BPM | WEIGHT: 227.4 LBS | BODY MASS INDEX: 34.46 KG/M2 | HEIGHT: 68 IN | DIASTOLIC BLOOD PRESSURE: 78 MMHG

## 2017-09-28 DIAGNOSIS — Z09 FOLLOW-UP EXAMINATION FOLLOWING SURGERY: ICD-10-CM

## 2017-09-28 DIAGNOSIS — M54.16 LUMBAR RADICULITIS: Primary | ICD-10-CM

## 2017-09-28 PROCEDURE — 99024 POSTOP FOLLOW-UP VISIT: CPT | Performed by: NURSE PRACTITIONER

## 2017-09-28 RX ORDER — METHYLPREDNISOLONE 4 MG/1
TABLET ORAL
Qty: 1 TABLET | Refills: 0 | Status: SHIPPED | OUTPATIENT
Start: 2017-09-28 | End: 2017-10-20

## 2017-09-28 RX ORDER — GABAPENTIN 300 MG/1
300 CAPSULE ORAL 3 TIMES DAILY
Qty: 90 CAPSULE | Refills: 1 | Status: SHIPPED | OUTPATIENT
Start: 2017-09-28 | End: 2017-10-20 | Stop reason: DRUGHIGH

## 2017-09-28 RX ORDER — HYDROCODONE BITARTRATE AND ACETAMINOPHEN 5; 325 MG/1; MG/1
1 TABLET ORAL EVERY 6 HOURS PRN
Qty: 30 TABLET | Refills: 0 | Status: SHIPPED | OUTPATIENT
Start: 2017-09-28 | End: 2020-06-16

## 2017-09-28 RX ORDER — METHOCARBAMOL 500 MG/1
500 TABLET, FILM COATED ORAL 4 TIMES DAILY
Qty: 40 TABLET | Refills: 0 | Status: SHIPPED | OUTPATIENT
Start: 2017-09-28 | End: 2017-10-20 | Stop reason: SDUPTHER

## 2017-09-28 NOTE — PROGRESS NOTES
Subjective   Patient ID: Enrike Price is a 51 y.o. male is here today for follow-up. Post form 09/13/17 with Dr. Dickson for L3-L4 Laminectomy and discectomy with Metrix. Pt has been having a lot spasm and tingling in lower back. He was prescribed a Medrol dose pack that seems to have helped with the sx. Pt is currently taking hydrocodone 5-325 up to 3 x's a day for pain. He also takes Cyclobenzaprine 10mg 2 -3 times a day.  Wound is clean and dry.    Back Pain   This is a new problem. The current episode started in the past 7 days. The problem occurs constantly. The problem has been gradually improving since onset. The pain is present in the lumbar spine and gluteal. The quality of the pain is described as aching. The pain radiates to the right thigh. The pain is moderate. The symptoms are aggravated by lying down and standing. Pertinent negatives include no abdominal pain, bladder incontinence, bowel incontinence, paresthesias, tingling or weakness. Treatments tried: Currently on day 3 of MDP.       The following portions of the patient's history were reviewed and updated as appropriate: allergies, current medications, past family history, past medical history, past social history, past surgical history and problem list.    Review of Systems   Gastrointestinal: Negative for abdominal pain and bowel incontinence.   Genitourinary: Negative for bladder incontinence.   Musculoskeletal: Positive for arthralgias and back pain.   Neurological: Negative for tingling, weakness and paresthesias.   All other systems reviewed and are negative.      Objective   Physical Exam   Constitutional: He appears well-developed. No distress.   Neurological:   No motor or sensory deficits in the lower extremities on exam.   Skin: Skin is warm and dry.   The lumbar incision is well approximated. No redness, swelling or drainage.    Psychiatric: He has a normal mood and affect.   Vitals reviewed.    Neurologic Exam    Assessment/Plan      Medical Decision Making:      Mr. Price is now 2 weeks out from L3-4 laminectomy and discectomy procedure with Dr. Dickson.  He had a sizable disc herniation that was removed during the surgery.  Nearly one week ago he began experiencing severe back spasms as well as right lateral hip and groin pain.  He was sent for lumbar x-rays which showed no evidence of fracture or malalignment.  He was started on a Medrol Dosepak.  He has since noticed some improvement in his symptoms.  He states that he is at least able to stand up and walk around more.    Mr. Price is still having back spasm issues.  The Flexeril has not helped much therefore he will switch to Robaxin.  He will also start some gabapentin as it will likely take a while for his nerve symptoms to improve.  He is scheduled for a trip to [place] for his children's fall break October 8.  He is planning to go on the trip.  He states that he will be doing no activities that will put him at risk for worsening pain symptoms.  I did give him another prescription for Medrol Dosepak to take with him on that trip should his symptoms worsen.    Mr. Price will begin some mild PT in the next few weeks. He will call if he has any worsening symptoms otherwise he will return in 3 weeks.       Enrike was seen today for post-op.    Diagnoses and all orders for this visit:    Lumbar radiculitis  -     Ambulatory Referral to Physical Therapy Evaluate and treat (for core strengthening and intro to HEP with intro to HEP)    Follow-up examination following surgery  -     Ambulatory Referral to Physical Therapy Evaluate and treat (for core strengthening and intro to HEP with intro to HEP)    Other orders  -     MethylPREDNISolone (MEDROL, KIM,) 4 MG tablet; follow package directions  -     gabapentin (NEURONTIN) 300 MG capsule; Take 1 capsule by mouth 3 (Three) Times a Day.  -     HYDROcodone-acetaminophen (NORCO) 5-325 MG per tablet; Take 1 tablet by mouth Every 6 (Six) Hours  As Needed for Moderate Pain  (pain).  -     methocarbamol (ROBAXIN) 500 MG tablet; Take 1 tablet by mouth 4 (Four) Times a Day.      Return in about 3 weeks (around 10/19/2017).        Body mass index is 34.58 kg/(m^2).

## 2017-10-05 ENCOUNTER — TELEPHONE (OUTPATIENT)
Dept: NEUROSURGERY | Facility: CLINIC | Age: 52
End: 2017-10-05

## 2017-10-05 NOTE — TELEPHONE ENCOUNTER
I think this is just the nerve recovering since he had such severe nerve compression preoperatively.  We could try another round of steroids but I really would not advise that.  We can give him a Medrol Dosepak if he really wants it.

## 2017-10-05 NOTE — TELEPHONE ENCOUNTER
"Patient is aware of the below. He does not want the MDP right now. He is increasing his walking and it feels better. He only had the \"Zingers\" if he lays down for very long. But he states that is improving.  "

## 2017-10-05 NOTE — TELEPHONE ENCOUNTER
"Pt called stating that he is still having some discomfort on his rt side, and when he coughs it feels like \"zingers\" going down his rt leg. Pt states he is wanting to go out of town for week. Please call and advise  "

## 2017-10-20 ENCOUNTER — OFFICE VISIT (OUTPATIENT)
Dept: NEUROSURGERY | Facility: CLINIC | Age: 52
End: 2017-10-20

## 2017-10-20 VITALS — DIASTOLIC BLOOD PRESSURE: 76 MMHG | SYSTOLIC BLOOD PRESSURE: 119 MMHG | HEART RATE: 107 BPM

## 2017-10-20 DIAGNOSIS — Z09 FOLLOW-UP EXAMINATION FOLLOWING SURGERY: Primary | ICD-10-CM

## 2017-10-20 DIAGNOSIS — M54.16 LUMBAR RADICULITIS: ICD-10-CM

## 2017-10-20 PROCEDURE — 99024 POSTOP FOLLOW-UP VISIT: CPT | Performed by: NURSE PRACTITIONER

## 2017-10-20 RX ORDER — GABAPENTIN 300 MG/1
CAPSULE ORAL
Qty: 120 CAPSULE | Refills: 2 | Status: SHIPPED | OUTPATIENT
Start: 2017-10-20 | End: 2020-06-16

## 2017-10-20 RX ORDER — METHOCARBAMOL 500 MG/1
500 TABLET, FILM COATED ORAL 4 TIMES DAILY
Qty: 40 TABLET | Refills: 0 | Status: SHIPPED | OUTPATIENT
Start: 2017-10-20 | End: 2020-06-16

## 2017-10-20 NOTE — PROGRESS NOTES
Subjective   Patient ID: Enrike Price is a 51 y.o. male is here today for follow-up. He is almost 6 weeks out from a L3-L4 Laminectomy and discectomy with Metrix. He has burning in his left upper thigh. He also has mild back pain. He is currently taking Norco 5/325 mg PRN pain, Gabapentin 300 mg TID.     Back Pain   This is a recurrent problem. The problem occurs intermittently. The problem has been gradually improving since onset. The pain is present in the lumbar spine. The pain radiates to the left thigh and left knee. The pain is at a severity of 3/10 (Leg- 6/10). The pain is mild. The symptoms are aggravated by lying down. Associated symptoms include numbness (Left side). Pertinent negatives include no bladder incontinence, bowel incontinence or chest pain. He has tried analgesics, NSAIDs and muscle relaxant (L3-4 Laminectomy) for the symptoms. The treatment provided moderate relief.       The following portions of the patient's history were reviewed and updated as appropriate: allergies, current medications, past family history, past medical history, past social history, past surgical history and problem list.    Review of Systems   Respiratory: Negative for chest tightness and shortness of breath.    Cardiovascular: Negative for chest pain.   Gastrointestinal: Negative for bowel incontinence.   Genitourinary: Negative for bladder incontinence.   Musculoskeletal: Positive for arthralgias and back pain.   Neurological: Positive for numbness (Left side).   All other systems reviewed and are negative.      Objective   Physical Exam   Constitutional: He appears well-developed. No distress.   Neurological:   No motor or sensory deficits on exam.    Skin: Skin is warm and dry.   Lumbar incision is healed.     Psychiatric: He has a normal mood and affect.   Vitals reviewed.    Neurologic Exam    Assessment/Plan     Medical Decision Making:      Mr. Price is now 5 weeks out from surgery.  He just returned from a  trip to [place] with his family.  He did do a lot of walking.  He is continuing to have anterior thigh pain right greater than left.  He also is having some back spasms.  Overall he does note improvement in his symptoms since surgery.  He understands that it will take time for the nerve symptoms to improve.  He is tolerating the gabapentin well.  We will increase the dose mildly to 300 mg in the morning and midday and 600 mg at night.  Mr. Abraham will start some physical therapy for core strengthening.  He will remain off work and follow-up in the office in 2 weeks.  Return to work and be discussed at that appointment.        Enrike was seen today for post-op and back pain.    Diagnoses and all orders for this visit:    Follow-up examination following surgery  -     Ambulatory Referral to Physical Therapy Evaluate and treat (for core strengthening with intro to HEP)    Lumbar radiculitis  -     Ambulatory Referral to Physical Therapy Evaluate and treat (for core strengthening with intro to HEP)    Other orders  -     gabapentin (NEURONTIN) 300 MG capsule; One capsule in am and midday; two capsules at bedtime  -     methocarbamol (ROBAXIN) 500 MG tablet; Take 1 tablet by mouth 4 (Four) Times a Day.      Return in about 2 weeks (around 11/3/2017).

## 2017-11-07 ENCOUNTER — OFFICE VISIT (OUTPATIENT)
Dept: NEUROSURGERY | Facility: CLINIC | Age: 52
End: 2017-11-07

## 2017-11-07 VITALS — SYSTOLIC BLOOD PRESSURE: 132 MMHG | DIASTOLIC BLOOD PRESSURE: 83 MMHG | HEART RATE: 95 BPM | HEIGHT: 68 IN

## 2017-11-07 DIAGNOSIS — Z09 FOLLOW-UP EXAMINATION FOLLOWING SURGERY: Primary | ICD-10-CM

## 2017-11-07 PROCEDURE — 99024 POSTOP FOLLOW-UP VISIT: CPT | Performed by: NURSE PRACTITIONER

## 2017-11-07 RX ORDER — AZELASTINE 1 MG/ML
SPRAY, METERED NASAL
Refills: 9 | COMMUNITY
Start: 2017-10-26 | End: 2020-06-16 | Stop reason: ALTCHOICE

## 2017-11-07 NOTE — PROGRESS NOTES
Subjective   Patient ID: Enrike Price is a 51 y.o. male is here today for follow-up.  He is 8 wks out from a L3-L4 Laminectomy and discectomy with Metrix by Dr. Dickson 9/13/17.  He is currently going to PT once a week. He takes Gabapentin 300/300/600,  Aleve 220 QD and Robaxin 500 mg PRN for pain.     HPI Comments: Mr. Price is now 8 weeks out from lumbar laminectomy and discectomy with Dr. Dickson.  He has begun to notice improvement in his pain.  He is still taking gabapentin and it is helping.  He has tolerated physical therapy well.  He feels ready to return to work.     Back Pain   The quality of the pain is described as burning (left thigh ). Pertinent negatives include no bladder incontinence, bowel incontinence or leg pain.           Review of Systems   Gastrointestinal: Negative for bowel incontinence.   Genitourinary: Negative for bladder incontinence.   Musculoskeletal: Positive for back pain.   All other systems reviewed and are negative.      Objective   Physical Exam   Constitutional: He appears well-developed. No distress.   Skin: Skin is warm and dry.   Lumbar incision healed.     Psychiatric: He has a normal mood and affect.     Neurologic Exam    Assessment/Plan         Medical Decision Making:      Mr. Price is doing well. He was cleared to return to work.  He will begin to slowly wean off the gabapentin.  He will continue walking and his home exercises.  If he has any worsening symptoms she will call the office otherwise he will see Dr. Dickson in one month.    Enrike was seen today for post-op and back pain.    Diagnoses and all orders for this visit:    Follow-up examination following surgery      Return in about 1 month (around 12/7/2017).

## 2017-12-12 ENCOUNTER — OFFICE VISIT (OUTPATIENT)
Dept: NEUROSURGERY | Facility: CLINIC | Age: 52
End: 2017-12-12

## 2017-12-12 VITALS — HEART RATE: 81 BPM | SYSTOLIC BLOOD PRESSURE: 125 MMHG | DIASTOLIC BLOOD PRESSURE: 80 MMHG

## 2017-12-12 DIAGNOSIS — Z09 FOLLOW-UP EXAMINATION FOLLOWING SURGERY: Primary | ICD-10-CM

## 2017-12-12 PROCEDURE — 99024 POSTOP FOLLOW-UP VISIT: CPT | Performed by: NEUROLOGICAL SURGERY

## 2017-12-12 NOTE — PROGRESS NOTES
Subjective   Patient ID: Enrike Price is a 52 y.o. male is here today for follow-up. He is 3 months out from a L3-L4 Laminectomy and discectomy with Metrix done on 9/13/17. He has some left side back pain on occassion. He has some left thigh pain.    History of Present Illness    This patient is doing quite well on the right side.  He does have some thigh pain on the left side but it has improved.  His back pain is improved a lot.    The following portions of the patient's history were reviewed and updated as appropriate: allergies, current medications, past family history, past medical history, past social history, past surgical history and problem list.    Review of Systems   Respiratory: Negative for chest tightness and shortness of breath.    Cardiovascular: Negative for chest pain.   Musculoskeletal: Positive for back pain.   All other systems reviewed and are negative.      Objective   Physical Exam   Constitutional: He is oriented to person, place, and time. He appears well-developed and well-nourished.   Neurological: He is oriented to person, place, and time.     Neurologic Exam     Mental Status   Oriented to person, place, and time.       Assessment/Plan   Independent Review of Radiographic Studies:      Medical Decision Making:      I told the patient to stay on his exercise program.  I will plan to check him again in about 6 weeks.  If he is still having a lot of trouble with his left leg than we will get a new MRI made to see how things look.  He agrees.    Enrike was seen today for back pain.    Diagnoses and all orders for this visit:    Follow-up examination following surgery    Return in about 6 weeks (around 1/23/2018).

## 2018-01-25 ENCOUNTER — OFFICE VISIT (OUTPATIENT)
Dept: NEUROSURGERY | Facility: CLINIC | Age: 53
End: 2018-01-25

## 2018-01-25 VITALS — HEART RATE: 94 BPM | DIASTOLIC BLOOD PRESSURE: 75 MMHG | SYSTOLIC BLOOD PRESSURE: 123 MMHG

## 2018-01-25 DIAGNOSIS — M51.16 NEURITIS OR RADICULITIS DUE TO RUPTURE OF LUMBAR INTERVERTEBRAL DISC: Primary | ICD-10-CM

## 2018-01-25 PROCEDURE — 99212 OFFICE O/P EST SF 10 MIN: CPT | Performed by: NEUROLOGICAL SURGERY

## 2018-01-25 NOTE — PROGRESS NOTES
Subjective   Patient ID: Enrike Price is a 52 y.o. male is here today for follow-up.  He is 4 months out from a L3-L4 Laminectomy and discectomy with Metrix done on 9/13/17. He has some left side back pain on occassion. He has some left thigh pain.     History of Present Illness    Patient returns today.  He has a little bit of pain in his left thigh still but it only gets bad when he walks really far or sits for a really long time.  Overall he is a lot better than he was prior to surgery.  He is taking two Neurontin a day.    The following portions of the patient's history were reviewed and updated as appropriate: allergies, current medications, past family history, past medical history, past social history, past surgical history and problem list.    Review of Systems   Respiratory: Negative for chest tightness and shortness of breath.    Cardiovascular: Negative for chest pain.   Musculoskeletal: Positive for back pain.        Thigh pain   All other systems reviewed and are negative.      Objective   Physical Exam   Constitutional: He is oriented to person, place, and time. He appears well-developed and well-nourished.   Neurological: He is oriented to person, place, and time.     Neurologic Exam     Mental Status   Oriented to person, place, and time.       Assessment/Plan   Independent Review of Radiographic Studies:      Medical Decision Making:      I told the patient that he can stay on the Neurontin.  It has now become controlled and so we can refill it one more time because it has a very low abuse potential but after that he will need to get it from either his PCP or from a pain management doctor.  Otherwise he will stay on his exercise program and I will check him again in about 3 months.    Enrike was seen today for back pain.    Diagnoses and all orders for this visit:    Neuritis or radiculitis due to rupture of lumbar intervertebral disc    Return in about 3 months (around 4/25/2018).

## 2018-03-27 ENCOUNTER — HOSPITAL ENCOUNTER (OUTPATIENT)
Dept: RESPIRATORY THERAPY | Facility: HOSPITAL | Age: 53
Discharge: HOME | End: 2018-03-27
Attending: INTERNAL MEDICINE | Admitting: INTERNAL MEDICINE

## 2018-04-13 ENCOUNTER — HOSPITAL ENCOUNTER (OUTPATIENT)
Dept: SLEEP MEDICINE | Facility: HOSPITAL | Age: 53
Discharge: HOME | End: 2018-04-13
Attending: INTERNAL MEDICINE | Admitting: INTERNAL MEDICINE

## 2018-04-25 NOTE — PROGRESS NOTES
Subjective   Patient ID: Enrike Price is a 52 y.o. male is here today for follow-up. He is 7 months out from a L3-L4 Laminectomy and discectomy with Metrix done on 9/13/17. He has some left thigh pain. His back is doing pretty well.    History of Present Illness    This patient returns today.  He is doing pretty well overall.  He does have some pain in his thigh particularly when he walks any distance but most of the time doesn't have much pain at all.    The following portions of the patient's history were reviewed and updated as appropriate: allergies, current medications, past family history, past medical history, past social history, past surgical history and problem list.    Review of Systems   Respiratory: Negative for chest tightness and shortness of breath.    Cardiovascular: Negative for chest pain.   Musculoskeletal:        Left thigh pain   All other systems reviewed and are negative.      Objective   Physical Exam   Constitutional: He appears well-developed and well-nourished.     Neurologic Exam    Assessment/Plan   Independent Review of Radiographic Studies:      Medical Decision Making:      I told the patient that he can continue to increase his normal activities.  He should avoid strenuously lifting long-term.  I told him to call me if anything flares up in the future we can always get him back in the office.  I told her loose some weight as well.    Enrike was seen today for leg pain.    Diagnoses and all orders for this visit:    Neuritis or radiculitis due to rupture of lumbar intervertebral disc      Return if symptoms worsen or fail to improve.

## 2018-04-26 ENCOUNTER — OFFICE VISIT (OUTPATIENT)
Dept: NEUROSURGERY | Facility: CLINIC | Age: 53
End: 2018-04-26

## 2018-04-26 VITALS — SYSTOLIC BLOOD PRESSURE: 124 MMHG | HEART RATE: 79 BPM | DIASTOLIC BLOOD PRESSURE: 76 MMHG

## 2018-04-26 DIAGNOSIS — M51.16 NEURITIS OR RADICULITIS DUE TO RUPTURE OF LUMBAR INTERVERTEBRAL DISC: Primary | ICD-10-CM

## 2018-04-26 PROCEDURE — 99212 OFFICE O/P EST SF 10 MIN: CPT | Performed by: NEUROLOGICAL SURGERY

## 2018-07-03 ENCOUNTER — HOSPITAL ENCOUNTER (OUTPATIENT)
Dept: RESPIRATORY THERAPY | Facility: HOSPITAL | Age: 53
Discharge: HOME | End: 2018-07-03
Attending: INTERNAL MEDICINE | Admitting: INTERNAL MEDICINE

## 2018-07-09 ENCOUNTER — TELEPHONE (OUTPATIENT)
Dept: NEUROSURGERY | Facility: CLINIC | Age: 53
End: 2018-07-09

## 2018-07-09 NOTE — TELEPHONE ENCOUNTER
Patient called and wanted to talk to Calista regarding his back & legs hurt and has done 2 rounds of steroids and they work for about 1 to 1 1/2 weeks.  The patient also has an appointment on 7-17-18.

## 2018-07-09 NOTE — TELEPHONE ENCOUNTER
I called patient and he is scheduled with Leslie tomorrow 07/10/2018 at 8:30. Patient did say he had Lumbar MRI without contrast done at [facility]. It is now scanned into the chart.

## 2018-07-11 NOTE — PROGRESS NOTES
Subjective   Patient ID: Enrike Price is a 52 y.o. male is here today for follow-up on back that radiates into bilateral lower extremity's L>R with difficulty changing positions from sitting to standing.    History of Present Illness    This patient began having more trouble with his back and his legs a few months ago.  I last saw him in April and at that time he was doing pretty well however after that he began to have more trouble.  The pain was initially quite severe in his left leg but more recently has gotten a little bit better in his left leg and has switched to his right leg.  The pain is in his posterior thighs and radiates down into his calves but not into his feet.  It is sharp and stabbing and at times quite severe.  He has no difficulty with bowel or bladder control or other associated symptoms.  He has been treated with 2 courses of oral steroids which helped but when he finished the pain came right back.  Activity seems to make the pain worse.    The following portions of the patient's history were reviewed and updated as appropriate: allergies, current medications, past family history, past medical history, past social history, past surgical history and problem list.    Review of Systems   Respiratory: Negative for chest tightness and shortness of breath.    Cardiovascular: Negative for chest pain.   Musculoskeletal: Positive for back pain.        Bilateral leg pain L>R   All other systems reviewed and are negative.      Objective   Physical Exam   Constitutional: He is oriented to person, place, and time. He appears well-developed and well-nourished.   Eyes: EOM are normal. Pupils are equal, round, and reactive to light.   Neurological: He is oriented to person, place, and time. He has a normal Finger-Nose-Finger Test and a normal Heel to Shin Test. Gait normal.   Reflex Scores:       Tricep reflexes are 2+ on the right side and 2+ on the left side.       Bicep reflexes are 2+ on the right side  and 2+ on the left side.       Brachioradialis reflexes are 2+ on the right side and 2+ on the left side.       Patellar reflexes are 2+ on the right side and 2+ on the left side.       Achilles reflexes are 2+ on the right side and 2+ on the left side.  Psychiatric: His speech is normal.     Neurologic Exam     Mental Status   Oriented to person, place, and time.   Registration of memory: Good recent and remote memory.   Attention: normal. Concentration: normal.   Speech: speech is normal   Level of consciousness: alert  Knowledge: consistent with education.     Cranial Nerves     CN II   Visual fields full to confrontation.   Visual acuity: normal    CN III, IV, VI   Pupils are equal, round, and reactive to light.  Extraocular motions are normal.     CN V   Facial sensation intact.   Right corneal reflex: normal  Left corneal reflex: normal    CN VII   Facial expression full, symmetric.   Right facial weakness: none  Left facial weakness: none    CN VIII   Hearing: intact    CN IX, X   Palate: symmetric    CN XI   Right sternocleidomastoid strength: normal  Left sternocleidomastoid strength: normal    CN XII   Tongue: not atrophic  Tongue deviation: none    Motor Exam   Muscle bulk: normal  Right arm tone: normal  Left arm tone: normal  Right leg tone: normal  Left leg tone: normal    Strength   Strength 5/5 except as noted.     Sensory Exam   Light touch normal.     Gait, Coordination, and Reflexes     Gait  Gait: normal    Coordination   Finger to nose coordination: normal  Heel to shin coordination: normal    Reflexes   Right brachioradialis: 2+  Left brachioradialis: 2+  Right biceps: 2+  Left biceps: 2+  Right triceps: 2+  Left triceps: 2+  Right patellar: 2+  Left patellar: 2+  Right achilles: 2+  Left achilles: 2+  Right : 2+  Left : 2+      Assessment/Plan   Independent Review of Radiographic Studies:      I reviewed an MRI of his lumbar spine done on 28 June.  This looks okay at L5-S1.  At L4 5  there is fairly significant foraminal stenosis bilaterally.  At L3 4 there is some residual or recurrent disc herniation on the right side and also some scar tissue.  L2-3 also shows a right-sided disc herniation although smaller.  L1 to mostly looks okay.    Medical Decision Making:      I told the patient and his wife about the imaging.  I told him that I see little option at this point but to proceed with a lumbar myelogram.  I told the patient what a myelogram involves.  I explained that there is a 50% chance of developing a bad headache and nausea as a result of the test.  I explained that there is also a very small chance of infection, seizures, and bleeding.  I explained how we would treat a post myelogram headache including bedrest, caffeinated fluids, steroids, and blood patch.  The patient does ask to proceed.    Enrike was seen today for back pain and leg pain.    Diagnoses and all orders for this visit:    Neuritis or radiculitis due to rupture of lumbar intervertebral disc  -     XR Spine Lumbar Complete With Flex & Ext; Future  -     Obtain Informed Consent; Standing  -     IR Myelogram Lumbar Spine; Future  -     CT Lumbar Spine Without Contrast; Future  -     No Lab Testing Needed; Standing  -     dexamethasone (DECADRON) 4 MG tablet; Take 2 tablets by mouth Take As Directed. Take both tablets by mouth 2 hours before myelogram      Return for After radiology test.

## 2018-07-12 ENCOUNTER — OFFICE VISIT (OUTPATIENT)
Dept: NEUROSURGERY | Facility: CLINIC | Age: 53
End: 2018-07-12

## 2018-07-12 VITALS — DIASTOLIC BLOOD PRESSURE: 87 MMHG | HEART RATE: 79 BPM | SYSTOLIC BLOOD PRESSURE: 132 MMHG

## 2018-07-12 DIAGNOSIS — M51.16 NEURITIS OR RADICULITIS DUE TO RUPTURE OF LUMBAR INTERVERTEBRAL DISC: Primary | ICD-10-CM

## 2018-07-12 PROCEDURE — 99213 OFFICE O/P EST LOW 20 MIN: CPT | Performed by: NEUROLOGICAL SURGERY

## 2018-07-12 RX ORDER — DEXAMETHASONE 4 MG/1
8 TABLET ORAL TAKE AS DIRECTED
Qty: 2 TABLET | Refills: 0 | Status: SHIPPED | OUTPATIENT
Start: 2018-07-12 | End: 2018-07-19 | Stop reason: HOSPADM

## 2018-07-19 ENCOUNTER — HOSPITAL ENCOUNTER (OUTPATIENT)
Dept: CT IMAGING | Facility: HOSPITAL | Age: 53
Discharge: HOME | End: 2018-07-19
Attending: NEUROLOGICAL SURGERY | Admitting: NEUROLOGICAL SURGERY

## 2018-07-19 ENCOUNTER — HOSPITAL ENCOUNTER (OUTPATIENT)
Dept: GENERAL RADIOLOGY | Facility: HOSPITAL | Age: 53
Discharge: HOME | End: 2018-07-19
Attending: NEUROLOGICAL SURGERY

## 2018-07-19 VITALS
BODY MASS INDEX: 36.37 KG/M2 | DIASTOLIC BLOOD PRESSURE: 77 MMHG | RESPIRATION RATE: 18 BRPM | WEIGHT: 240 LBS | HEART RATE: 83 BPM | HEIGHT: 68 IN | TEMPERATURE: 98.2 F | OXYGEN SATURATION: 96 % | SYSTOLIC BLOOD PRESSURE: 125 MMHG

## 2018-07-19 DIAGNOSIS — M51.16 NEURITIS OR RADICULITIS DUE TO RUPTURE OF LUMBAR INTERVERTEBRAL DISC: ICD-10-CM

## 2018-07-19 PROCEDURE — 72131 CT LUMBAR SPINE W/O DYE: CPT

## 2018-07-19 PROCEDURE — 72240 MYELOGRAPHY NECK SPINE: CPT

## 2018-07-19 PROCEDURE — 0 IOPAMIDOL 41 % SOLUTION: Performed by: NEUROLOGICAL SURGERY

## 2018-07-19 PROCEDURE — 72114 X-RAY EXAM L-S SPINE BENDING: CPT

## 2018-07-19 PROCEDURE — 62304 MYELOGRAPHY LUMBAR INJECTION: CPT

## 2018-07-19 RX ORDER — LIDOCAINE HYDROCHLORIDE 10 MG/ML
10 INJECTION, SOLUTION INFILTRATION; PERINEURAL ONCE
Status: COMPLETED | OUTPATIENT
Start: 2018-07-19 | End: 2018-07-19

## 2018-07-19 RX ORDER — HYDROCODONE BITARTRATE AND ACETAMINOPHEN 5; 325 MG/1; MG/1
1 TABLET ORAL EVERY 4 HOURS PRN
Status: DISCONTINUED | OUTPATIENT
Start: 2018-07-19 | End: 2018-07-20 | Stop reason: HOSPADM

## 2018-07-19 RX ORDER — ACETAMINOPHEN 325 MG/1
650 TABLET ORAL EVERY 4 HOURS PRN
Status: DISCONTINUED | OUTPATIENT
Start: 2018-07-19 | End: 2018-07-20 | Stop reason: HOSPADM

## 2018-07-19 RX ADMIN — IOPAMIDOL 20 ML: 408 INJECTION, SOLUTION INTRATHECAL at 07:40

## 2018-07-19 RX ADMIN — LIDOCAINE HYDROCHLORIDE 6 ML: 10 INJECTION, SOLUTION INFILTRATION; PERINEURAL at 07:32

## 2018-07-19 RX ADMIN — HYDROCODONE BITARTATE AND ACETAMINOPHEN 1 TABLET: 5; 325 TABLET ORAL at 08:10

## 2018-07-19 NOTE — DISCHARGE INSTRUCTIONS
EDUCATION /DISCHARGE INSTRUCTIONS:  A myelogram is a special radiology procedure of the spinal cord, spinal nerves and other related structures.  You will be awake during the examination.  An area of your lower back will be cleansed with an antiseptic solution.  The physician will inject a numbing medication in your lower back.  While your back is numb, a needle will be placed in the lower back area.  A small amount of spinal fluid may be withdrawn and sent to the lab if ordered by your physician. While the needle is in the back, an injection of a contrast material (xray dye) will be given through the needle.  The contrast material will allow the physician to see the spinal cord and spinal nerves.  Once injected, the needle will be removed and a band aid will be placed over the injection site.  The table will be tilted during the process to allow the contrast material to flow to particular areas in the spine.  Following the injection and xrays, you will be taken to the CT scan where more pictures will be taken. After the procedure is finished, the contrast material will be absorbed by your body and eliminated through your kidneys.  The radiologist will study and interpret your myelogram and send the results to your physician.    Procedure risks of a myelogram include, but are not limited to:  *  Bleeding   *  seizure  *  Infection   *  Headache, possibly severe requiring  *  Contrast reaction      a blood patch  *  Nerve or cord injury  *  Paralysis and death    Benefits of the procedure:  *  Best examination for delineating pathology related to spinal cord compression from a disc and/or nerve root compression    Alternatives to the procedure:  MRI - a non invasive procedure requiring intravenous contrast injection.  Cannot be done on patients with certain pacemakers or metal in the body.  MRI risks include possible reaction to the contrast material, movement of metal located in the body.  Benefit to MRI:   Non-invasive and usually painless procedure.  THIS EDUCATION INFORMATION WAS REVIEWED PRIOR TO PROCEDURE AND CONSENT. Patient initials________________Time______0702____________    Important information following your myelogram:  * ACTIVITY:   *  Lie down with your head elevated no more than 2 pillows high today & tonight  *  Sit up to eat your meals and use the restroom, otherwise, lie down.  *  Remain less active for two to three days.  *  Do not drive for 48 hours following a myelogram  *  You may remove the bandage and shower in the morning  *  Increase your fluids for the next 24 hours.  Caffeinated drinks are encouraged.    Resume taking blood thinner or aspirin on ___No Aspirin for 24 hours after the myelogram___    CALL YOUR PHYSICIAN FOR THE FOLLOWING:  * Pain at the injections site  * Reddness, swelling, bruising or drainage at the injection site.  * A fever by mouth of 101.0  * Any new symptoms    Headaches are a common side effect after a myelogram.  If you get a headache, you should stay flat in bed and drink plenty of fluids. If the headache persist and does not go away with rest/medication, CALL Dr. Dickson at [phone]

## 2018-07-20 ENCOUNTER — TELEPHONE (OUTPATIENT)
Dept: INTERVENTIONAL RADIOLOGY/VASCULAR | Facility: HOSPITAL | Age: 53
End: 2018-07-20

## 2018-10-23 ENCOUNTER — HOSPITAL ENCOUNTER (OUTPATIENT)
Dept: RESPIRATORY THERAPY | Facility: HOSPITAL | Age: 53
Discharge: HOME | End: 2018-10-23
Attending: INTERNAL MEDICINE | Admitting: INTERNAL MEDICINE

## 2019-05-14 ENCOUNTER — HOSPITAL ENCOUNTER (OUTPATIENT)
Dept: RESPIRATORY THERAPY | Facility: HOSPITAL | Age: 54
Discharge: HOME | End: 2019-05-14
Attending: NURSE PRACTITIONER | Admitting: NURSE PRACTITIONER

## 2020-06-16 ENCOUNTER — DOCUMENTATION (OUTPATIENT)
Dept: PULMONOLOGY | Facility: HOSPITAL | Age: 55
End: 2020-06-16

## 2020-06-16 DIAGNOSIS — G47.33 OSA (OBSTRUCTIVE SLEEP APNEA): Primary | ICD-10-CM

## 2020-06-16 RX ORDER — MOMETASONE FUROATE 50 UG/1
2 SPRAY, METERED NASAL DAILY PRN
COMMUNITY

## 2020-06-16 NOTE — PROGRESS NOTES
PULMONARY/ CRITICAL CARE/ SLEEP MEDICINE OUTPATIENT CONSULT/ FOLLOW UP NOTE        Patient Name:  Enrike Price    :  1965    Medical Record:  [id number]    PRIMARY CARE PHYSICIAN     Abraham Montoya MD    REASON FOR CONSULTATION    Enrike Price is a 54 y.o. male who is seen via telemedicine for follow-up on his sleep apnea.  His sleep study showed an apnea hypopnea index of 34, respiratory disturbance index of 49, lowest oxygen saturation is 71%.  His typical bedtime is around 12 midnight.  He gets up around 6 AM.  He is using his CPAP with a nasal mask.  He denies any specific problems with the mask or machine.  He does not take any scheduled naps.  Overall feels well.  No specific exacerbating factors.  Symptoms respond to CPAP use.    REVIEW OF SYSTEMS    Constitutional:  Denies fever or chills   Eyes:  Denies change in visual acuity   HENT:  Denies nasal congestion or sore throat   Respiratory:  Denies cough or shortness of breath   Cardiovascular:  Denies chest pain or edema   GI:  Denies abdominal pain, nausea, vomiting, bloody stools or diarrhea   :  Denies dysuria   Musculoskeletal:  + back pain no joint pain   Integument:  Denies rash   Neurologic:  Denies headache, focal weakness or sensory changes   Endocrine:  Denies polyuria or polydipsia   Lymphatic:  Denies swollen glands   Psychiatric:  Denies depression or anxiety     MEDICAL HISTORY    Past Medical History:   Diagnosis Date   • Back pain    • Hypertension    • IBS (irritable bowel syndrome)    • PONV (postoperative nausea and vomiting)    • Sleep apnea, obstructive         SURGICAL HISTORY    Past Surgical History:   Procedure Laterality Date   • CARPAL TUNNEL RELEASE Bilateral    • CERVICAL DISC SURGERY     • COLONOSCOPY W/ POLYPECTOMY  2018   • LUMBAR DISCECTOMY FUSION INSTRUMENTATION Right 2017    Procedure: Right L3 4 laminectomy and discectomy with Metrix;  Surgeon: Eric Dickson MD;  Location: [facility]  OR;  Service:         FAMILY HISTORY    Family History   Problem Relation Age of Onset   • No Known Problems Mother    • No Known Problems Father    • No Known Problems Sister    • Malig Hyperthermia Neg Hx        SOCIAL HISTORY    Social History     Tobacco Use   • Smoking status: Never Smoker   • Smokeless tobacco: Never Used   Substance Use Topics   • Alcohol use: No        ALLERGIES    Allergies   Allergen Reactions   • Aspirin GI Intolerance         MEDICATIONS    Current Outpatient Medications on File Prior to Visit   Medication Sig Dispense Refill   • ibuprofen (ADVIL,MOTRIN) 200 MG tablet Take 200 mg by mouth Every 6 (Six) Hours As Needed for Mild Pain (1-3).     • lisinopril-hydrochlorothiazide (PRINZIDE,ZESTORETIC) 20-12.5 MG per tablet Take 1 tablet by mouth Daily.     • methscopolamine (PAMINE FORTE) 5 MG tablet Take 5 mg by mouth Every Night.     • mometasone (NASONEX) 50 MCG/ACT nasal spray 2 sprays into the nostril(s) as directed by provider Daily.     • pantoprazole (PROTONIX) 40 MG pack packet Take 40 mg by mouth Every Morning Before Breakfast.     • simvastatin (ZOCOR) 40 MG tablet Take 40 mg by mouth Every Night.     • [DISCONTINUED] azelastine (ASTELIN) 0.1 % nasal spray   9   • [DISCONTINUED] fexofenadine (ALLEGRA) 180 MG tablet Take 180 mg by mouth Daily.     • [DISCONTINUED] gabapentin (NEURONTIN) 300 MG capsule One capsule in am and midday; two capsules at bedtime 120 capsule 2   • [DISCONTINUED] HYDROcodone-acetaminophen (NORCO) 5-325 MG per tablet Take 1 tablet by mouth Every 6 (Six) Hours As Needed for Moderate Pain  (pain). 30 tablet 0   • [DISCONTINUED] methocarbamol (ROBAXIN) 500 MG tablet Take 1 tablet by mouth 4 (Four) Times a Day. 40 tablet 0   • [DISCONTINUED] Multiple Vitamin (MULTI VITAMIN PO) Take  by mouth.     • [DISCONTINUED] Probiotic capsule Take 1 capsule by mouth Daily.       No current facility-administered medications on file prior to visit.        PHYSICAL EXAM    There  were no vitals filed for this visit.   Vital signs and physical exam could not be done because this being a telehealth visit due to COVID 19 pandemic emergency  Patient reported weight of 240 pounds      ASSESSMENT & PLAN:    There are no diagnoses linked to this encounter.  Obstructive sleep apnea  -CPAP compliance data sheet reviewed.  Total days with the machine use are 155, days without the machine use are 25.  Average use is 4 hours and 22 minutes.  Average AHI 0.7.  Days use more than 4 hours nightly: 0.7  -Advised patient to use CPAP more than 4 hours more than 70% of the nights at least  -CPAP supplies order updated    Hypertension  -Patient reports good blood pressure control    We will plan to see him back in office in about 6 months or earlier if any problems arise    This was a phone conversation in lieu of in-person visit.  The patient provided verbal consent for an over the phone visit.  I spent  5 minutes on the call conducting an interview, performing a limited exam by phone and educating the patient on my assessment and plan.  Additional 10 minutes were spent on documentation and review of data for this visit.  Patient did not have the ability to do a video visit for this encounter.    This document has been electronically signed by  Cori Bey MD  11:24 AM

## 2020-12-03 ENCOUNTER — OFFICE VISIT (OUTPATIENT)
Dept: PULMONOLOGY | Facility: HOSPITAL | Age: 55
End: 2020-12-03

## 2020-12-03 VITALS
TEMPERATURE: 98.7 F | OXYGEN SATURATION: 96 % | BODY MASS INDEX: 37.13 KG/M2 | HEART RATE: 80 BPM | SYSTOLIC BLOOD PRESSURE: 136 MMHG | DIASTOLIC BLOOD PRESSURE: 95 MMHG | RESPIRATION RATE: 12 BRPM | WEIGHT: 245 LBS | HEIGHT: 68 IN

## 2020-12-03 DIAGNOSIS — G47.33 OSA (OBSTRUCTIVE SLEEP APNEA): Primary | ICD-10-CM

## 2020-12-03 PROCEDURE — G0463 HOSPITAL OUTPT CLINIC VISIT: HCPCS

## 2020-12-03 RX ORDER — LEVOCETIRIZINE DIHYDROCHLORIDE 5 MG/1
5 TABLET, FILM COATED ORAL DAILY PRN
COMMUNITY

## 2020-12-03 RX ORDER — CHLORCYCLIZINE HYDROCHLORIDE AND PSEUDOEPHEDRINE HYDROCHLORIDE 25; 60 MG/1; MG/1
1 TABLET ORAL
COMMUNITY
Start: 2020-11-03 | End: 2022-07-18 | Stop reason: SDUPTHER

## 2020-12-03 RX ORDER — LISINOPRIL AND HYDROCHLOROTHIAZIDE 12.5; 1 MG/1; MG/1
1 TABLET ORAL DAILY
COMMUNITY
Start: 2020-09-10

## 2020-12-03 NOTE — PROGRESS NOTES
PULMONARY/ CRITICAL CARE/ SLEEP MEDICINE OUTPATIENT CONSULT/ FOLLOW UP NOTE        Patient Name:  Enrike Price    :  1965    Medical Record:  [id number]    PRIMARY CARE PHYSICIAN     Abraham Montoya MD    REASON FOR CONSULTATION    Enrike Price is a 54 y.o. male who is referred for consultation for AUDREY    REVIEW OF SYSTEMS    Constitutional:  Denies fever or chills   Eyes:  Denies change in visual acuity   HENT:  Denies nasal congestion or sore throat   Respiratory:  Denies cough or shortness of breath   Cardiovascular:  Denies chest pain or edema   GI:  Denies abdominal pain, nausea, vomiting, bloody stools or diarrhea   :  Denies dysuria   Musculoskeletal:  Denies back pain or joint pain   Integument:  Denies rash   Neurologic:  Denies headache, focal weakness or sensory changes   Endocrine:  Denies polyuria or polydipsia   Lymphatic:  Denies swollen glands   Psychiatric:  Denies depression or anxiety     MEDICAL HISTORY    Past Medical History:   Diagnosis Date   • Back pain    • Hypertension    • IBS (irritable bowel syndrome)    • PONV (postoperative nausea and vomiting)    • Sleep apnea, obstructive         SURGICAL HISTORY    Past Surgical History:   Procedure Laterality Date   • CARPAL TUNNEL RELEASE Bilateral    • CERVICAL DISC SURGERY     • COLONOSCOPY W/ POLYPECTOMY  2018   • LUMBAR DISCECTOMY FUSION INSTRUMENTATION Right 2017    Procedure: Right L3 4 laminectomy and discectomy with Metrix;  Surgeon: Eric Dickson MD;  Location: [facility];  Service:         FAMILY HISTORY    Family History   Problem Relation Age of Onset   • No Known Problems Mother    • No Known Problems Father    • No Known Problems Sister    • Malig Hyperthermia Neg Hx        SOCIAL HISTORY    Social History     Tobacco Use   • Smoking status: Never Smoker   • Smokeless tobacco: Never Used   Substance Use Topics   • Alcohol use: No        ALLERGIES    Allergies   Allergen Reactions   • Aspirin  "GI Intolerance   • Baclofen Other (See Comments)     Per patient caused nausea and vomiting, diarrhea, and \"jerking motions with hands/dropping items\"  Per patient symptoms resolved when stopped taking baclofen         MEDICATIONS    Current Outpatient Medications on File Prior to Visit   Medication Sig Dispense Refill   • Chlorcyclizine-Pseudoephed (Stahist AD) 25-60 MG tablet Take 1 tablet by mouth.     • ibuprofen (ADVIL,MOTRIN) 200 MG tablet Take 200 mg by mouth Every 6 (Six) Hours As Needed for Mild Pain (1-3).     • levocetirizine (XYZAL) 5 MG tablet Take 5 mg by mouth Every Evening.     • lisinopril-hydrochlorothiazide (PRINZIDE,ZESTORETIC) 10-12.5 MG per tablet Take 1 tablet by mouth Daily.     • methscopolamine (PAMINE FORTE) 5 MG tablet Take 5 mg by mouth Every Night.     • mometasone (NASONEX) 50 MCG/ACT nasal spray 2 sprays into the nostril(s) as directed by provider Daily.     • pantoprazole (PROTONIX) 40 MG pack packet Take 40 mg by mouth Every Morning Before Breakfast.     • simvastatin (ZOCOR) 40 MG tablet Take 40 mg by mouth Every Night.     • [DISCONTINUED] lisinopril-hydrochlorothiazide (PRINZIDE,ZESTORETIC) 20-12.5 MG per tablet Take 1 tablet by mouth Daily.       No current facility-administered medications on file prior to visit.        PHYSICAL EXAM    Vitals:    12/03/20 1307   BP: 136/95   BP Location: Left arm   Patient Position: Sitting   Cuff Size: Large Adult   Pulse: 80   Resp: 12   Temp: 98.7 °F (37.1 °C)   TempSrc: Oral   SpO2: 96%   Weight: 111 kg (245 lb)   Height: 172.7 cm (68\")        Constitutional:  Well developed, well nourished, no acute distress, non-toxic appearance   Eyes:  PERRL, conjunctiva normal   HENT:  Atraumatic, external ears normal, nose normal, oropharynx moist, no pharyngeal exudates. mallampatti   Neck- normal range of motion, no tenderness, supple   Respiratory:  No respiratory distress, normal breath sounds, no rales, no wheezing   Cardiovascular:  Normal " rate, normal rhythm, no murmurs, no gallops, no rubs   GI:  Soft, nondistended, normal bowel sounds, nontender, no organomegaly, no mass, no rebound, no guarding   :  No costovertebral angle tenderness   Musculoskeletal:  No edema, no tenderness, no deformities. Back- no tenderness  Integument:  Well hydrated, no rash   Lymphatic:  No lymphadenopathy noted   Neurologic:  Alert & oriented x 3, CN 2-12 normal, normal motor function, normal sensory function, no focal deficits noted   Psychiatric:  Speech and behavior appropriate     No radiology results for the last 90 days.        ASSESSMENT & PLAN:        Obstructive of sleep apnea has been on CPAP since 4/27/2018 residual AHI 0.6 average hours 4 hours 10 minutes   compliance 44%, decreased compliance due to chronic back pain  History of hypertension      Plan  Continue CPAP at 12 discussed with patient increased hours the issue is his chronic back pain  DME is [facility]  Safety driving  Adequate sleep hygiene  Discussed with patient cardiovascular risk    This document has been electronically signed by  Tatiana Curran MD  13:22 EST

## 2021-03-26 ENCOUNTER — OFFICE (OUTPATIENT)
Dept: URBAN - METROPOLITAN AREA CLINIC 64 | Facility: CLINIC | Age: 56
End: 2021-03-26
Payer: COMMERCIAL

## 2021-03-26 VITALS
WEIGHT: 252 LBS | SYSTOLIC BLOOD PRESSURE: 166 MMHG | HEIGHT: 68 IN | HEART RATE: 90 BPM | DIASTOLIC BLOOD PRESSURE: 84 MMHG

## 2021-03-26 DIAGNOSIS — R19.7 DIARRHEA, UNSPECIFIED: ICD-10-CM

## 2021-03-26 DIAGNOSIS — K58.9 IRRITABLE BOWEL SYNDROME WITHOUT DIARRHEA: ICD-10-CM

## 2021-03-26 PROCEDURE — 99203 OFFICE O/P NEW LOW 30 MIN: CPT | Performed by: INTERNAL MEDICINE

## 2021-06-03 ENCOUNTER — OFFICE VISIT (OUTPATIENT)
Dept: PULMONOLOGY | Facility: HOSPITAL | Age: 56
End: 2021-06-03

## 2021-06-03 VITALS
TEMPERATURE: 98.4 F | DIASTOLIC BLOOD PRESSURE: 75 MMHG | OXYGEN SATURATION: 97 % | HEART RATE: 84 BPM | BODY MASS INDEX: 37.89 KG/M2 | SYSTOLIC BLOOD PRESSURE: 133 MMHG | RESPIRATION RATE: 13 BRPM | WEIGHT: 250 LBS | HEIGHT: 68 IN

## 2021-06-03 DIAGNOSIS — G47.33 OSA (OBSTRUCTIVE SLEEP APNEA): Primary | ICD-10-CM

## 2021-06-03 PROBLEM — Z99.89 OSA ON CPAP: Status: ACTIVE | Noted: 2021-06-03

## 2021-06-03 PROCEDURE — G0463 HOSPITAL OUTPT CLINIC VISIT: HCPCS

## 2021-06-03 RX ORDER — CLOTRIMAZOLE 1 %
1 CREAM (GRAM) TOPICAL DAILY
COMMUNITY
Start: 2021-04-23

## 2021-06-03 RX ORDER — PANTOPRAZOLE SODIUM 40 MG/1
40 TABLET, DELAYED RELEASE ORAL DAILY
COMMUNITY
Start: 2021-03-16

## 2021-06-03 RX ORDER — DICYCLOMINE HCL 20 MG
TABLET ORAL
COMMUNITY
Start: 2021-03-26

## 2021-06-03 NOTE — PROGRESS NOTES
PULMONARY/ CRITICAL CARE/ SLEEP MEDICINE OUTPATIENT CONSULT/ FOLLOW UP NOTE        Patient Name:  Enrike Price    :  1965    Medical Record:  [id number]    PRIMARY CARE PHYSICIAN     Abraham Montoya MD    REASON FOR CONSULTATION    Enrike Price is a 55 y.o. male who is referred for consultation for obstructive sleep apnea  REVIEW OF SYSTEMS    Constitutional:  Denies fever or chills   Eyes:  Denies change in visual acuity   HENT:  Denies nasal congestion or sore throat   Respiratory:  Denies cough or shortness of breath   Cardiovascular:  Denies chest pain or edema   GI:  Denies abdominal pain, nausea, vomiting, bloody stools or diarrhea   :  Denies dysuria   Musculoskeletal:  Denies back pain or joint pain   Integument:  Denies rash   Neurologic:  Denies headache, focal weakness or sensory changes   Endocrine:  Denies polyuria or polydipsia   Lymphatic:  Denies swollen glands   Psychiatric:  Denies depression or anxiety     MEDICAL HISTORY    Past Medical History:   Diagnosis Date   • Back pain    • Hypertension    • IBS (irritable bowel syndrome)    • PONV (postoperative nausea and vomiting)    • Sleep apnea, obstructive         SURGICAL HISTORY    Past Surgical History:   Procedure Laterality Date   • CARPAL TUNNEL RELEASE Bilateral    • CERVICAL DISC SURGERY     • COLONOSCOPY W/ POLYPECTOMY  2018   • LUMBAR DISCECTOMY FUSION INSTRUMENTATION Right 2017    Procedure: Right L3 4 laminectomy and discectomy with Metrix;  Surgeon: Eric Dickson MD;  Location: [facility];  Service:         FAMILY HISTORY    Family History   Problem Relation Age of Onset   • No Known Problems Mother    • No Known Problems Father    • No Known Problems Sister    • Malig Hyperthermia Neg Hx        SOCIAL HISTORY    Social History     Tobacco Use   • Smoking status: Never Smoker   • Smokeless tobacco: Never Used   Substance Use Topics   • Alcohol use: No        ALLERGIES    Allergies   Allergen  "Reactions   • Aspirin GI Intolerance   • Baclofen Other (See Comments)     Per patient caused nausea and vomiting, diarrhea, and \"jerking motions with hands/dropping items\"  Per patient symptoms resolved when stopped taking baclofen         MEDICATIONS    Current Outpatient Medications on File Prior to Visit   Medication Sig Dispense Refill   • Chlorcyclizine-Pseudoephed (Stahist AD) 25-60 MG tablet Take 1 tablet by mouth.     • clotrimazole (LOTRIMIN) 1 % cream MIX WITH HYDROCORTISONE AND APPLY TWICE DAILY     • dicyclomine (BENTYL) 20 MG tablet TAKE 1 TABLET BY MOUTH EVERY 6 HOURS AS NEEDED FOR CRAMPS     • hydrocortisone 2.5 % cream MIX WITH CLOTRIMAZOLE AND APPLY TWICE DAILY     • ibuprofen (ADVIL,MOTRIN) 200 MG tablet Take 200 mg by mouth Every 6 (Six) Hours As Needed for Mild Pain (1-3).     • levocetirizine (XYZAL) 5 MG tablet Take 5 mg by mouth Every Evening.     • lisinopril-hydrochlorothiazide (PRINZIDE,ZESTORETIC) 10-12.5 MG per tablet Take 1 tablet by mouth Daily.     • methscopolamine (PAMINE FORTE) 5 MG tablet Take 5 mg by mouth Every Night.     • mometasone (NASONEX) 50 MCG/ACT nasal spray 2 sprays into the nostril(s) as directed by provider Daily.     • pantoprazole (PROTONIX) 40 MG EC tablet Take 40 mg by mouth Daily.     • pantoprazole (PROTONIX) 40 MG pack packet Take 40 mg by mouth Every Morning Before Breakfast.     • simvastatin (ZOCOR) 40 MG tablet Take 40 mg by mouth Every Night.       No current facility-administered medications on file prior to visit.       PHYSICAL EXAM    Vitals:    06/03/21 1539   BP: 133/75   BP Location: Left arm   Patient Position: Sitting   Cuff Size: Adult   Pulse: 84   Resp: 13   Temp: 98.4 °F (36.9 °C)   TempSrc: Oral   SpO2: 97%   Weight: 113 kg (250 lb)   Height: 172.7 cm (68\")        Constitutional:  Well developed, well nourished, no acute distress, non-toxic appearance   Eyes:  PERRL, conjunctiva normal   HENT:  Atraumatic, external ears normal, nose normal, " oropharynx moist, no pharyngeal exudates. mallampatti   Neck- normal range of motion, no tenderness, supple   Respiratory:  No respiratory distress, normal breath sounds, no rales, no wheezing   Cardiovascular:  Normal rate, normal rhythm, no murmurs, no gallops, no rubs   GI:  Soft, nondistended, normal bowel sounds, nontender, no organomegaly, no mass, no rebound, no guarding   :  No costovertebral angle tenderness   Musculoskeletal:  No edema, no tenderness, no deformities. Back- no tenderness  Integument:  Well hydrated, no rash   Lymphatic:  No lymphadenopathy noted   Neurologic:  Alert & oriented x 3, CN 2-12 normal, normal motor function, normal sensory function, no focal deficits noted   Psychiatric:  Speech and behavior appropriate     No radiology results for the last 90 days.       ASSESSMENT & PLAN:          Obstructive of sleep apnea has been on CPAP since 4/27/2018 residual AHI 0.8 average hours 4 hours 10 minutes   compliance 44%, decreased compliance due to chronic back pain  History of hypertension        Plan  Continue CPAP at 14 discussed with patient increased hours the issue is his chronic back pain  DME is [employer]  Safety driving  Adequate sleep hygiene  Discussed with patient cardiovascular risk       This document has been electronically signed by  Tatiana Curran MD  16:33 EDT

## 2021-08-09 ENCOUNTER — OFFICE (OUTPATIENT)
Dept: URBAN - METROPOLITAN AREA CLINIC 64 | Facility: CLINIC | Age: 56
End: 2021-08-09
Payer: COMMERCIAL

## 2021-08-09 VITALS
HEIGHT: 68 IN | DIASTOLIC BLOOD PRESSURE: 89 MMHG | SYSTOLIC BLOOD PRESSURE: 147 MMHG | HEART RATE: 93 BPM | WEIGHT: 250 LBS

## 2021-08-09 DIAGNOSIS — K58.0 IRRITABLE BOWEL SYNDROME WITH DIARRHEA: ICD-10-CM

## 2021-08-09 PROCEDURE — 99213 OFFICE O/P EST LOW 20 MIN: CPT | Performed by: INTERNAL MEDICINE

## 2022-07-18 ENCOUNTER — OFFICE VISIT (OUTPATIENT)
Dept: PULMONOLOGY | Facility: HOSPITAL | Age: 57
End: 2022-07-18

## 2022-07-18 VITALS
HEART RATE: 89 BPM | WEIGHT: 245 LBS | HEIGHT: 68 IN | OXYGEN SATURATION: 97 % | SYSTOLIC BLOOD PRESSURE: 131 MMHG | DIASTOLIC BLOOD PRESSURE: 84 MMHG | BODY MASS INDEX: 37.13 KG/M2 | RESPIRATION RATE: 12 BRPM

## 2022-07-18 DIAGNOSIS — G47.33 OSA (OBSTRUCTIVE SLEEP APNEA): Primary | ICD-10-CM

## 2022-07-18 PROBLEM — M47.812 CERVICAL SPONDYLOSIS WITHOUT MYELOPATHY: Status: ACTIVE | Noted: 2022-07-18

## 2022-07-18 PROBLEM — J30.9 ALLERGIC RHINITIS: Status: ACTIVE | Noted: 2022-07-18

## 2022-07-18 PROBLEM — K21.9 GASTROESOPHAGEAL REFLUX DISEASE: Status: ACTIVE | Noted: 2022-07-18

## 2022-07-18 PROCEDURE — G0463 HOSPITAL OUTPT CLINIC VISIT: HCPCS

## 2022-07-18 RX ORDER — CHLORCYCLIZINE HYDROCHLORIDE AND PSEUDOEPHEDRINE HYDROCHLORIDE 25; 60 MG/1; MG/1
1 TABLET ORAL DAILY PRN
COMMUNITY
Start: 2022-02-23

## 2022-07-18 RX ORDER — DESONIDE 0.5 MG/G
1 CREAM TOPICAL DAILY PRN
COMMUNITY
Start: 2022-07-12

## 2022-07-18 RX ORDER — TIZANIDINE 4 MG/1
1 TABLET ORAL AS NEEDED
Status: ON HOLD | COMMUNITY
Start: 2022-04-25 | End: 2023-02-18

## 2022-07-18 NOTE — PROGRESS NOTES
PULMONARY/ CRITICAL CARE/ SLEEP MEDICINE OUTPATIENT CONSULT/ FOLLOW UP NOTE        Patient Name:  Enrike Price    :  1965    Medical Record:  [id number]    PRIMARY CARE PHYSICIAN     Abraham Montoya MD    REASON FOR CONSULTATION    Enrike Price is a 56 y.o. male who is referred for consultation for obstructive sleep apnea  REVIEW OF SYSTEMS    Constitutional:  Denies fever or chills   Eyes:  Denies change in visual acuity   HENT:  Denies nasal congestion or sore throat   Respiratory:  Denies cough or shortness of breath   Cardiovascular:  Denies chest pain or edema   GI:  Denies abdominal pain, nausea, vomiting, bloody stools or diarrhea   :  Denies dysuria   Musculoskeletal:  Denies back pain or joint pain   Integument:  Denies rash   Neurologic:  Denies headache, focal weakness or sensory changes   Endocrine:  Denies polyuria or polydipsia   Lymphatic:  Denies swollen glands   Psychiatric:  Denies depression or anxiety     MEDICAL HISTORY    Past Medical History:   Diagnosis Date   • Back pain    • Hypertension    • IBS (irritable bowel syndrome)    • PONV (postoperative nausea and vomiting)    • Sleep apnea, obstructive         SURGICAL HISTORY    Past Surgical History:   Procedure Laterality Date   • CARPAL TUNNEL RELEASE Bilateral    • CERVICAL DISC SURGERY     • COLONOSCOPY W/ POLYPECTOMY  2018   • LUMBAR DISCECTOMY FUSION INSTRUMENTATION Right 2017    Procedure: Right L3 4 laminectomy and discectomy with Metrix;  Surgeon: Eric Dickson MD;  Location: [facility];  Service:         FAMILY HISTORY    Family History   Problem Relation Age of Onset   • No Known Problems Mother    • No Known Problems Father    • No Known Problems Sister    • Malig Hyperthermia Neg Hx        SOCIAL HISTORY    Social History     Tobacco Use   • Smoking status: Never Smoker   • Smokeless tobacco: Never Used   Substance Use Topics   • Alcohol use: No        ALLERGIES    Allergies   Allergen  "Reactions   • Aspirin GI Intolerance   • Baclofen Other (See Comments)     Per patient caused nausea and vomiting, diarrhea, and \"jerking motions with hands/dropping items\"  Per patient symptoms resolved when stopped taking baclofen         MEDICATIONS    Current Outpatient Medications on File Prior to Visit   Medication Sig Dispense Refill   • Chlorcyclizine-Pseudoephed (Stahist AD) 25-60 MG tablet Take 1 tablet by mouth Daily As Needed.     • clotrimazole (LOTRIMIN) 1 % cream MIX WITH HYDROCORTISONE AND APPLY TWICE DAILY     • desonide (DESOWEN) 0.05 % cream Use as directed     • dicyclomine (BENTYL) 20 MG tablet TAKE 1 TABLET BY MOUTH EVERY 6 HOURS AS NEEDED FOR CRAMPS     • hydrocortisone 2.5 % cream MIX WITH CLOTRIMAZOLE AND APPLY TWICE DAILY     • ibuprofen (ADVIL,MOTRIN) 200 MG tablet Take 200 mg by mouth Every 6 (Six) Hours As Needed for Mild Pain (1-3).     • levocetirizine (XYZAL) 5 MG tablet Take 5 mg by mouth Every Evening.     • lisinopril-hydrochlorothiazide (PRINZIDE,ZESTORETIC) 10-12.5 MG per tablet Take 1 tablet by mouth Daily.     • methscopolamine (PAMINE FORTE) 5 MG tablet Take 5 mg by mouth Every Night.     • mometasone (NASONEX) 50 MCG/ACT nasal spray 2 sprays into the nostril(s) as directed by provider Daily.     • pantoprazole (PROTONIX) 40 MG EC tablet Take 40 mg by mouth Daily.     • simvastatin (ZOCOR) 40 MG tablet Take 40 mg by mouth Every Night.     • tiZANidine (ZANAFLEX) 4 MG tablet Take 1 tablet by mouth As Needed.     • [DISCONTINUED] Chlorcyclizine-Pseudoephed (Stahist AD) 25-60 MG tablet Take 1 tablet by mouth.     • [DISCONTINUED] pantoprazole (PROTONIX) 40 MG pack packet Take 40 mg by mouth Every Morning Before Breakfast.       No current facility-administered medications on file prior to visit.       PHYSICAL EXAM    Vitals:    07/18/22 1521   BP: 131/84   BP Location: Left arm   Patient Position: Sitting   Cuff Size: Adult   Pulse: 89   Resp: 12   SpO2: 97%   Weight: 111 kg " "(245 lb)   Height: 172.7 cm (68\")        Constitutional:  Well developed, well nourished, no acute distress, non-toxic appearance   Eyes:  PERRL, conjunctiva normal   HENT:  Atraumatic, external ears normal, nose normal, oropharynx moist, no pharyngeal exudates. mallampatti   Neck- normal range of motion, no tenderness, supple   Respiratory:  No respiratory distress, normal breath sounds, no rales, no wheezing   Cardiovascular:  Normal rate, normal rhythm, no murmurs, no gallops, no rubs   GI:  Soft, nondistended, normal bowel sounds, nontender, no organomegaly, no mass, no rebound, no guarding   :  No costovertebral angle tenderness   Musculoskeletal:  No edema, no tenderness, no deformities. Back- no tenderness  Integument:  Well hydrated, no rash   Lymphatic:  No lymphadenopathy noted   Neurologic:  Alert & oriented x 3, CN 2-12 normal, normal motor function, normal sensory function, no focal deficits noted   Psychiatric:  Speech and behavior appropriate     No radiology results for the last 90 days.       ASSESSMENT & PLAN:      Obstructive of sleep apnea has been on CPAP since 4/27/2018   Last download residual AHI 0.8 average hours 4 hours 10 minutes   compliance 44%, decreased compliance due to chronic back pain  History of hypertension        Plan    Discussed with patient recall, current machine is old, order new machine CPAP at 14   DME is [employer]  Safety driving  Adequate sleep hygiene  Discussed with patient cardiovascular risk       This document has been electronically signed by  Tatiana Curran MD  15:32 EDT  "

## 2022-08-03 ENCOUNTER — OFFICE (OUTPATIENT)
Dept: URBAN - METROPOLITAN AREA CLINIC 64 | Facility: CLINIC | Age: 57
End: 2022-08-03
Payer: COMMERCIAL

## 2022-08-03 VITALS
DIASTOLIC BLOOD PRESSURE: 66 MMHG | SYSTOLIC BLOOD PRESSURE: 110 MMHG | WEIGHT: 245 LBS | HEART RATE: 92 BPM | HEIGHT: 68 IN

## 2022-08-03 DIAGNOSIS — K58.0 IRRITABLE BOWEL SYNDROME WITH DIARRHEA: ICD-10-CM

## 2022-08-03 PROCEDURE — 99214 OFFICE O/P EST MOD 30 MIN: CPT | Performed by: INTERNAL MEDICINE

## 2023-02-17 ENCOUNTER — APPOINTMENT (OUTPATIENT)
Dept: GENERAL RADIOLOGY | Facility: HOSPITAL | Age: 58
End: 2023-02-17
Payer: COMMERCIAL

## 2023-02-17 ENCOUNTER — APPOINTMENT (OUTPATIENT)
Dept: CT IMAGING | Facility: HOSPITAL | Age: 58
End: 2023-02-17
Payer: COMMERCIAL

## 2023-02-17 ENCOUNTER — HOSPITAL ENCOUNTER (OUTPATIENT)
Facility: HOSPITAL | Age: 58
Setting detail: OBSERVATION
Discharge: HOME | End: 2023-02-18
Attending: EMERGENCY MEDICINE | Admitting: EMERGENCY MEDICINE
Payer: COMMERCIAL

## 2023-02-17 DIAGNOSIS — R07.9 CHEST PAIN, UNSPECIFIED TYPE: Primary | ICD-10-CM

## 2023-02-17 LAB
ALBUMIN SERPL-MCNC: 4.4 G/DL (ref 3.5–5.2)
ALBUMIN/GLOB SERPL: 1.8 G/DL
ALP SERPL-CCNC: 84 U/L (ref 39–117)
ALT SERPL W P-5'-P-CCNC: 26 U/L (ref 1–41)
ANION GAP SERPL CALCULATED.3IONS-SCNC: 12 MMOL/L (ref 5–15)
APTT PPP: 26.1 SECONDS (ref 61–76.5)
AST SERPL-CCNC: 15 U/L (ref 1–40)
BILIRUB SERPL-MCNC: 0.2 MG/DL (ref 0–1.2)
BUN SERPL-MCNC: 15 MG/DL (ref 6–20)
BUN/CREAT SERPL: 19.7 (ref 7–25)
CALCIUM SPEC-SCNC: 9.1 MG/DL (ref 8.6–10.5)
CHLORIDE SERPL-SCNC: 101 MMOL/L (ref 98–107)
CO2 SERPL-SCNC: 24 MMOL/L (ref 22–29)
CREAT SERPL-MCNC: 0.76 MG/DL (ref 0.76–1.27)
DEPRECATED RDW RBC AUTO: 44.2 FL (ref 37–54)
EGFRCR SERPLBLD CKD-EPI 2021: 106.1 ML/MIN/1.73
ERYTHROCYTE [DISTWIDTH] IN BLOOD BY AUTOMATED COUNT: 14.4 % (ref 12.3–15.4)
GLOBULIN UR ELPH-MCNC: 2.5 GM/DL
GLUCOSE SERPL-MCNC: 160 MG/DL (ref 65–99)
HCT VFR BLD AUTO: 45.8 % (ref 37.5–51)
HGB BLD-MCNC: 14.6 G/DL (ref 13–17.7)
INR PPP: 1.02 (ref 0.93–1.1)
LYMPHOCYTES # BLD MANUAL: 3.48 10*3/MM3 (ref 0.7–3.1)
LYMPHOCYTES NFR BLD MANUAL: 4 % (ref 5–12)
MAGNESIUM SERPL-MCNC: 1.9 MG/DL (ref 1.6–2.6)
MCH RBC QN AUTO: 27.9 PG (ref 26.6–33)
MCHC RBC AUTO-ENTMCNC: 32 G/DL (ref 31.5–35.7)
MCV RBC AUTO: 87.4 FL (ref 79–97)
MONOCYTES # BLD: 0.58 10*3/MM3 (ref 0.1–0.9)
NEUTROPHILS # BLD AUTO: 10.44 10*3/MM3 (ref 1.7–7)
NEUTROPHILS NFR BLD MANUAL: 71 % (ref 42.7–76)
NEUTS BAND NFR BLD MANUAL: 1 % (ref 0–5)
PLAT MORPH BLD: NORMAL
PLATELET # BLD AUTO: 285 10*3/MM3 (ref 140–450)
PMV BLD AUTO: 7.9 FL (ref 6–12)
POTASSIUM SERPL-SCNC: 4.3 MMOL/L (ref 3.5–5.2)
PROT SERPL-MCNC: 6.9 G/DL (ref 6–8.5)
PROTHROMBIN TIME: 10.5 SECONDS (ref 9.6–11.7)
RBC # BLD AUTO: 5.24 10*6/MM3 (ref 4.14–5.8)
RBC MORPH BLD: NORMAL
SCAN SLIDE: NORMAL
SODIUM SERPL-SCNC: 137 MMOL/L (ref 136–145)
TROPONIN T SERPL HS-MCNC: <6 NG/L
VARIANT LYMPHS NFR BLD MANUAL: 24 % (ref 19.6–45.3)
WBC MORPH BLD: NORMAL
WBC NRBC COR # BLD: 14.5 10*3/MM3 (ref 3.4–10.8)

## 2023-02-17 PROCEDURE — 93005 ELECTROCARDIOGRAM TRACING: CPT

## 2023-02-17 PROCEDURE — 36415 COLL VENOUS BLD VENIPUNCTURE: CPT

## 2023-02-17 PROCEDURE — 85025 COMPLETE CBC W/AUTO DIFF WBC: CPT | Performed by: EMERGENCY MEDICINE

## 2023-02-17 PROCEDURE — 99285 EMERGENCY DEPT VISIT HI MDM: CPT

## 2023-02-17 PROCEDURE — 85610 PROTHROMBIN TIME: CPT | Performed by: EMERGENCY MEDICINE

## 2023-02-17 PROCEDURE — 0 IOPAMIDOL PER 1 ML: Performed by: EMERGENCY MEDICINE

## 2023-02-17 PROCEDURE — 80053 COMPREHEN METABOLIC PANEL: CPT | Performed by: EMERGENCY MEDICINE

## 2023-02-17 PROCEDURE — 85730 THROMBOPLASTIN TIME PARTIAL: CPT | Performed by: EMERGENCY MEDICINE

## 2023-02-17 PROCEDURE — 83735 ASSAY OF MAGNESIUM: CPT | Performed by: EMERGENCY MEDICINE

## 2023-02-17 PROCEDURE — 85007 BL SMEAR W/DIFF WBC COUNT: CPT | Performed by: EMERGENCY MEDICINE

## 2023-02-17 PROCEDURE — 71275 CT ANGIOGRAPHY CHEST: CPT

## 2023-02-17 PROCEDURE — 84484 ASSAY OF TROPONIN QUANT: CPT | Performed by: EMERGENCY MEDICINE

## 2023-02-17 PROCEDURE — 71045 X-RAY EXAM CHEST 1 VIEW: CPT

## 2023-02-17 RX ORDER — CALCIUM CARBONATE 200(500)MG
1 TABLET,CHEWABLE ORAL ONCE
Status: DISCONTINUED | OUTPATIENT
Start: 2023-02-17 | End: 2023-02-18 | Stop reason: HOSPADM

## 2023-02-17 RX ORDER — ASPIRIN 81 MG/1
162 TABLET, CHEWABLE ORAL ONCE
Status: COMPLETED | OUTPATIENT
Start: 2023-02-17 | End: 2023-02-17

## 2023-02-17 RX ORDER — SODIUM CHLORIDE 0.9 % (FLUSH) 0.9 %
10 SYRINGE (ML) INJECTION AS NEEDED
Status: DISCONTINUED | OUTPATIENT
Start: 2023-02-17 | End: 2023-02-18 | Stop reason: HOSPADM

## 2023-02-17 RX ADMIN — IOPAMIDOL 100 ML: 755 INJECTION, SOLUTION INTRAVENOUS at 23:47

## 2023-02-17 RX ADMIN — ASPIRIN 81 MG CHEWABLE TABLET 162 MG: 81 TABLET CHEWABLE at 23:07

## 2023-02-17 RX ADMIN — NITROGLYCERIN 1 INCH: 20 OINTMENT TOPICAL at 23:07

## 2023-02-18 ENCOUNTER — APPOINTMENT (OUTPATIENT)
Dept: NUCLEAR MEDICINE | Facility: HOSPITAL | Age: 58
End: 2023-02-18
Payer: COMMERCIAL

## 2023-02-18 VITALS
DIASTOLIC BLOOD PRESSURE: 88 MMHG | OXYGEN SATURATION: 98 % | BODY MASS INDEX: 36.54 KG/M2 | WEIGHT: 241.1 LBS | HEART RATE: 91 BPM | TEMPERATURE: 99.7 F | RESPIRATION RATE: 16 BRPM | HEIGHT: 68 IN | SYSTOLIC BLOOD PRESSURE: 133 MMHG

## 2023-02-18 PROBLEM — R07.9 CHEST PAIN: Status: ACTIVE | Noted: 2023-02-18

## 2023-02-18 LAB
BH CV REST NUCLEAR ISOTOPE DOSE: 10.9 MCI
BH CV STRESS BP STAGE 1: NORMAL
BH CV STRESS DURATION MIN STAGE 1: 3
BH CV STRESS DURATION MIN STAGE 2: 3
BH CV STRESS DURATION SEC STAGE 1: 0
BH CV STRESS DURATION SEC STAGE 2: 53
BH CV STRESS GRADE STAGE 1: 10
BH CV STRESS GRADE STAGE 2: 12
BH CV STRESS HR STAGE 1: 115
BH CV STRESS HR STAGE 2: 141
BH CV STRESS METS STAGE 1: 5
BH CV STRESS METS STAGE 2: 7.5
BH CV STRESS NUCLEAR ISOTOPE DOSE: 33 MCI
BH CV STRESS PROTOCOL 1: NORMAL
BH CV STRESS RECOVERY BP: NORMAL MMHG
BH CV STRESS RECOVERY HR: 105 BPM
BH CV STRESS SPEED STAGE 1: 1.7
BH CV STRESS SPEED STAGE 2: 2.5
BH CV STRESS STAGE 1: 1
BH CV STRESS STAGE 2: 2
GEN 5 2HR TROPONIN T REFLEX: <6 NG/L
LV EF NUC BP: 75 %
MAXIMAL PREDICTED HEART RATE: 165 BPM
NT-PROBNP SERPL-MCNC: <36 PG/ML (ref 0–900)
PERCENT MAX PREDICTED HR: 85.45 %
STRESS BASELINE BP: NORMAL MMHG
STRESS BASELINE HR: 83 BPM
STRESS PERCENT HR: 101 %
STRESS POST PEAK BP: NORMAL MMHG
STRESS POST PEAK HR: 141 BPM
STRESS TARGET HR: 140 BPM
TROPONIN T DELTA: NORMAL

## 2023-02-18 PROCEDURE — G0378 HOSPITAL OBSERVATION PER HR: HCPCS

## 2023-02-18 PROCEDURE — 99221 1ST HOSP IP/OBS SF/LOW 40: CPT | Performed by: NURSE PRACTITIONER

## 2023-02-18 PROCEDURE — 78452 HT MUSCLE IMAGE SPECT MULT: CPT

## 2023-02-18 PROCEDURE — 93018 CV STRESS TEST I&R ONLY: CPT | Performed by: INTERNAL MEDICINE

## 2023-02-18 PROCEDURE — 84484 ASSAY OF TROPONIN QUANT: CPT | Performed by: EMERGENCY MEDICINE

## 2023-02-18 PROCEDURE — 83880 ASSAY OF NATRIURETIC PEPTIDE: CPT | Performed by: NURSE PRACTITIONER

## 2023-02-18 PROCEDURE — A9502 TC99M TETROFOSMIN: HCPCS | Performed by: EMERGENCY MEDICINE

## 2023-02-18 PROCEDURE — 93017 CV STRESS TEST TRACING ONLY: CPT

## 2023-02-18 PROCEDURE — 0 TECHNETIUM TETROFOSMIN KIT: Performed by: EMERGENCY MEDICINE

## 2023-02-18 PROCEDURE — 93016 CV STRESS TEST SUPVJ ONLY: CPT | Performed by: NURSE PRACTITIONER

## 2023-02-18 PROCEDURE — 78452 HT MUSCLE IMAGE SPECT MULT: CPT | Performed by: INTERNAL MEDICINE

## 2023-02-18 RX ORDER — SODIUM CHLORIDE 0.9 % (FLUSH) 0.9 %
10 SYRINGE (ML) INJECTION EVERY 12 HOURS SCHEDULED
Status: DISCONTINUED | OUTPATIENT
Start: 2023-02-18 | End: 2023-02-18 | Stop reason: HOSPADM

## 2023-02-18 RX ORDER — SODIUM CHLORIDE 9 MG/ML
40 INJECTION, SOLUTION INTRAVENOUS AS NEEDED
Status: DISCONTINUED | OUTPATIENT
Start: 2023-02-18 | End: 2023-02-18 | Stop reason: HOSPADM

## 2023-02-18 RX ORDER — ONDANSETRON 4 MG/1
4 TABLET, FILM COATED ORAL EVERY 6 HOURS PRN
Status: DISCONTINUED | OUTPATIENT
Start: 2023-02-18 | End: 2023-02-18 | Stop reason: HOSPADM

## 2023-02-18 RX ORDER — BUSPIRONE HYDROCHLORIDE 15 MG/1
15 TABLET ORAL NIGHTLY
COMMUNITY

## 2023-02-18 RX ORDER — LISINOPRIL 5 MG/1
10 TABLET ORAL
Status: DISCONTINUED | OUTPATIENT
Start: 2023-02-18 | End: 2023-02-18 | Stop reason: HOSPADM

## 2023-02-18 RX ORDER — ACETAMINOPHEN 325 MG/1
650 TABLET ORAL EVERY 4 HOURS PRN
Status: DISCONTINUED | OUTPATIENT
Start: 2023-02-18 | End: 2023-02-18 | Stop reason: HOSPADM

## 2023-02-18 RX ORDER — HYDROCHLOROTHIAZIDE 12.5 MG/1
12.5 TABLET ORAL
Status: DISCONTINUED | OUTPATIENT
Start: 2023-02-18 | End: 2023-02-18 | Stop reason: HOSPADM

## 2023-02-18 RX ORDER — CETIRIZINE HYDROCHLORIDE 10 MG/1
10 TABLET ORAL DAILY
Status: DISCONTINUED | OUTPATIENT
Start: 2023-02-18 | End: 2023-02-18 | Stop reason: HOSPADM

## 2023-02-18 RX ORDER — ATORVASTATIN CALCIUM 20 MG/1
20 TABLET, FILM COATED ORAL DAILY
Status: DISCONTINUED | OUTPATIENT
Start: 2023-02-18 | End: 2023-02-18 | Stop reason: HOSPADM

## 2023-02-18 RX ORDER — ONDANSETRON 2 MG/ML
4 INJECTION INTRAMUSCULAR; INTRAVENOUS EVERY 6 HOURS PRN
Status: DISCONTINUED | OUTPATIENT
Start: 2023-02-18 | End: 2023-02-18 | Stop reason: HOSPADM

## 2023-02-18 RX ORDER — NITROGLYCERIN 0.4 MG/1
0.4 TABLET SUBLINGUAL
Status: DISCONTINUED | OUTPATIENT
Start: 2023-02-18 | End: 2023-02-18 | Stop reason: HOSPADM

## 2023-02-18 RX ORDER — BUSPIRONE HYDROCHLORIDE 15 MG/1
15 TABLET ORAL NIGHTLY
Status: DISCONTINUED | OUTPATIENT
Start: 2023-02-18 | End: 2023-02-18 | Stop reason: HOSPADM

## 2023-02-18 RX ORDER — BISACODYL 5 MG/1
5 TABLET, DELAYED RELEASE ORAL DAILY PRN
Status: DISCONTINUED | OUTPATIENT
Start: 2023-02-18 | End: 2023-02-18 | Stop reason: HOSPADM

## 2023-02-18 RX ORDER — DICYCLOMINE HYDROCHLORIDE 10 MG/1
20 CAPSULE ORAL EVERY 6 HOURS PRN
Status: DISCONTINUED | OUTPATIENT
Start: 2023-02-18 | End: 2023-02-18 | Stop reason: HOSPADM

## 2023-02-18 RX ORDER — PANTOPRAZOLE SODIUM 40 MG/1
40 TABLET, DELAYED RELEASE ORAL DAILY
Status: DISCONTINUED | OUTPATIENT
Start: 2023-02-18 | End: 2023-02-18 | Stop reason: HOSPADM

## 2023-02-18 RX ORDER — CHOLECALCIFEROL (VITAMIN D3) 125 MCG
5 CAPSULE ORAL NIGHTLY PRN
Status: DISCONTINUED | OUTPATIENT
Start: 2023-02-18 | End: 2023-02-18 | Stop reason: HOSPADM

## 2023-02-18 RX ORDER — ONDANSETRON 2 MG/ML
4 INJECTION INTRAMUSCULAR; INTRAVENOUS EVERY 6 HOURS PRN
Status: DISCONTINUED | OUTPATIENT
Start: 2023-02-18 | End: 2023-02-18

## 2023-02-18 RX ORDER — POLYETHYLENE GLYCOL 3350 17 G/17G
17 POWDER, FOR SOLUTION ORAL DAILY PRN
Status: DISCONTINUED | OUTPATIENT
Start: 2023-02-18 | End: 2023-02-18 | Stop reason: HOSPADM

## 2023-02-18 RX ORDER — BISACODYL 10 MG
10 SUPPOSITORY, RECTAL RECTAL DAILY PRN
Status: DISCONTINUED | OUTPATIENT
Start: 2023-02-18 | End: 2023-02-18 | Stop reason: HOSPADM

## 2023-02-18 RX ORDER — BUSPIRONE HYDROCHLORIDE 15 MG/1
7.5 TABLET ORAL DAILY
Status: DISCONTINUED | OUTPATIENT
Start: 2023-02-18 | End: 2023-02-18 | Stop reason: HOSPADM

## 2023-02-18 RX ORDER — BUSPIRONE HYDROCHLORIDE 5 MG/1
7.5 TABLET ORAL DAILY
COMMUNITY

## 2023-02-18 RX ORDER — SODIUM CHLORIDE 0.9 % (FLUSH) 0.9 %
10 SYRINGE (ML) INJECTION AS NEEDED
Status: DISCONTINUED | OUTPATIENT
Start: 2023-02-18 | End: 2023-02-18 | Stop reason: HOSPADM

## 2023-02-18 RX ADMIN — HYDROCHLOROTHIAZIDE 12.5 MG: 12.5 TABLET ORAL at 11:37

## 2023-02-18 RX ADMIN — LISINOPRIL 10 MG: 5 TABLET ORAL at 11:37

## 2023-02-18 RX ADMIN — PANTOPRAZOLE SODIUM 40 MG: 40 TABLET, DELAYED RELEASE ORAL at 11:37

## 2023-02-18 RX ADMIN — TETROFOSMIN 1 DOSE: 1.38 INJECTION, POWDER, LYOPHILIZED, FOR SOLUTION INTRAVENOUS at 07:55

## 2023-02-18 RX ADMIN — TETROFOSMIN 1 DOSE: 1.38 INJECTION, POWDER, LYOPHILIZED, FOR SOLUTION INTRAVENOUS at 10:00

## 2023-02-18 RX ADMIN — BUSPIRONE HYDROCHLORIDE 7.5 MG: 15 TABLET ORAL at 11:37

## 2023-02-18 NOTE — DISCHARGE SUMMARY
[facility]    Abraham Montoya MD    CHIEF COMPLAINT:     Chest Pain     HISTORY OF PRESENT ILLNESS:    HPI     Patient is a pleasant 57-year-old who presents to the emergency room with acute onset chest pain.  2 hours prior to arrival he had sudden onset chest pain.  A tightness all across the front of his chest.  He feels it in his back as well.  It waxes and wanes.  It started suddenly when he was having dinner.  No abdominal pain or vomiting.  He has had some sweating but no shortness of breath with it.  Leading up to this he felt fine.  This happened suddenly.  He tried his anxiety medicine.  Has had a lot of stress lately.  He thought may be anxiety was a problem.  That did not help.  He tried 2 different antacids.  However he is never had pain like this with his acid reflux before had pain like this before.  He has never had a stress test before.  He does have a history of hypertension and hyperlipidemia.  He has never had a pulmonary embolism.  No recent long trip.  No swelling in his legs.    Past Medical History:   Diagnosis Date   • Back pain    • Hypertension    • IBS (irritable bowel syndrome)    • PONV (postoperative nausea and vomiting)    • Sleep apnea, obstructive      Past Surgical History:   Procedure Laterality Date   • CARPAL TUNNEL RELEASE Bilateral 2014   • CERVICAL DISC SURGERY  2015   • COLONOSCOPY W/ POLYPECTOMY  07/18/2018   • LUMBAR DISCECTOMY FUSION INSTRUMENTATION Right 9/13/2017    Procedure: Right L3 4 laminectomy and discectomy with Metrix;  Surgeon: Eric Dickson MD;  Location: [facility];  Service:      Family History   Problem Relation Age of Onset   • No Known Problems Mother    • No Known Problems Father    • No Known Problems Sister    • Malig Hyperthermia Neg Hx      Social History     Tobacco Use   • Smoking status: Never   • Smokeless tobacco: Never   Vaping Use   • Vaping Use: Never used   Substance Use Topics   • Alcohol use: No   • Drug use:  No     Medications Prior to Admission   Medication Sig Dispense Refill Last Dose   • busPIRone (BUSPAR) 15 MG tablet Take 15 mg by mouth Every Night.      • busPIRone (BUSPAR) 5 MG tablet Take 7.5 mg by mouth Daily.      • Chlorcyclizine-Pseudoephed (Stahist AD) 25-60 MG tablet Take 1 tablet by mouth Daily As Needed.      • clotrimazole (LOTRIMIN) 1 % cream 1 application Daily. Groin      • desonide (DESOWEN) 0.05 % cream 1 application Daily As Needed. Use as directed      • dicyclomine (BENTYL) 20 MG tablet TAKE 1 TABLET BY MOUTH EVERY 6 HOURS AS NEEDED FOR CRAMPS      • hydrocortisone 2.5 % cream MIX WITH CLOTRIMAZOLE AND APPLY TWICE DAILY      • ibuprofen (ADVIL,MOTRIN) 200 MG tablet Take 200 mg by mouth Every 6 (Six) Hours As Needed for Mild Pain (1-3).      • levocetirizine (XYZAL) 5 MG tablet Take 5 mg by mouth Daily As Needed.      • lisinopril-hydrochlorothiazide (PRINZIDE,ZESTORETIC) 10-12.5 MG per tablet Take 1 tablet by mouth Daily.   2/17/2023 at 0900   • methscopolamine (PAMINE FORTE) 5 MG tablet Take 2.5 mg by mouth Daily As Needed.      • mometasone (NASONEX) 50 MCG/ACT nasal spray 2 sprays into the nostril(s) as directed by provider Daily As Needed.      • pantoprazole (PROTONIX) 40 MG EC tablet Take 40 mg by mouth Daily.   2/17/2023 at 0900   • simvastatin (ZOCOR) 40 MG tablet Take 40 mg by mouth Daily.   2/17/2023 at 0900     Allergies:  Aspirin and Baclofen    Immunization History   Administered Date(s) Administered   • COVID-19 (PFIZER) PURPLE CAP 12/29/2020, 01/19/2021           REVIEW OF SYSTEMS:    Review of Systems   Constitutional: Negative.   HENT: Negative.    Eyes: Negative.    Cardiovascular: Negative.    Respiratory: Negative.    Endocrine: Negative.    Hematologic/Lymphatic: Negative.    Skin: Negative.    Musculoskeletal: Negative.    Gastrointestinal: Negative.    Genitourinary: Negative.    Neurological: Negative.    Psychiatric/Behavioral: Negative.    Allergic/Immunologic:  Negative.        Vital Signs  Temp:  [97.1 °F (36.2 °C)-98.8 °F (37.1 °C)] 98.6 °F (37 °C)  Heart Rate:  [] 77  Resp:  [15-18] 15  BP: (127-153)/(80-92) 132/87          Physical Exam:  Physical Exam  Vitals and nursing note reviewed.   Constitutional:       Appearance: Normal appearance.   HENT:      Head: Normocephalic and atraumatic.      Right Ear: External ear normal.      Left Ear: External ear normal.      Nose: Nose normal.      Mouth/Throat:      Mouth: Mucous membranes are moist.      Pharynx: Oropharynx is clear.   Eyes:      Extraocular Movements: Extraocular movements intact.      Conjunctiva/sclera: Conjunctivae normal.      Pupils: Pupils are equal, round, and reactive to light.   Cardiovascular:      Rate and Rhythm: Normal rate and regular rhythm.      Pulses: Normal pulses.      Heart sounds: Normal heart sounds.   Pulmonary:      Effort: Pulmonary effort is normal.      Breath sounds: Normal breath sounds.   Abdominal:      General: Bowel sounds are normal.      Palpations: Abdomen is soft.   Musculoskeletal:         General: Normal range of motion.      Cervical back: Normal range of motion.   Skin:     General: Skin is warm.      Capillary Refill: Capillary refill takes less than 2 seconds.   Neurological:      General: No focal deficit present.      Mental Status: He is alert and oriented to person, place, and time.   Psychiatric:         Mood and Affect: Mood normal.         Behavior: Behavior normal.         Thought Content: Thought content normal.         Judgment: Judgment normal.         Emotional Behavior:    WNL   Debilities:   none  Results Review:    I reviewed the patient's new clinical results.  Lab Results (most recent)     Procedure Component Value Units Date/Time    BNP [[id number]]  (Normal) Collected: 02/18/23 0104    Specimen: Blood Updated: 02/18/23 0749     proBNP <36.0 pg/mL     Narrative:      Among patients with dyspnea, NT-proBNP is highly sensitive for the detection  of acute congestive heart failure. In addition NT-proBNP of <300 pg/ml effectively rules out acute congestive heart failure with 99% negative predictive value.    Results may be falsely decreased if patient taking Biotin.      High Sensitivity Troponin T 2Hr [[id number]] Collected: 02/18/23 0104    Specimen: Blood Updated: 02/18/23 0129     HS Troponin T <6 ng/L      Troponin T Delta --     Comment: Unable to calculate.       Narrative:      High Sensitive Troponin T Reference Range:  <10.0 ng/L- Negative Female for AMI  <15.0 ng/L- Negative Male for AMI  >=10 - Abnormal Female indicating possible myocardial injury.  >=15 - Abnormal Male indicating possible myocardial injury.   Clinicians would have to utilize clinical acumen, EKG, Troponin, and serial changes to determine if it is an Acute Myocardial Infarction or myocardial injury due to an underlying chronic condition.         CBC & Differential [[id number]]  (Abnormal) Collected: 02/17/23 2257    Specimen: Blood Updated: 02/17/23 2332    Narrative:      The following orders were created for panel order CBC & Differential.  Procedure                               Abnormality         Status                     ---------                               -----------         ------                     CBC Auto Differential[[id number]]        Abnormal            Final result               Scan Slide[[id number]]                                       Final result                 Please view results for these tests on the individual orders.    Scan Slide [[id number]] Collected: 02/17/23 2257    Specimen: Blood Updated: 02/17/23 2332     Scan Slide --     Comment: See Manual Differential Results       Manual Differential [[id number]]  (Abnormal) Collected: 02/17/23 2257    Specimen: Blood Updated: 02/17/23 2332     Neutrophil % 71.0 %      Lymphocyte % 24.0 %      Monocyte % 4.0 %      Bands %  1.0 %      Neutrophils Absolute 10.44 10*3/mm3      Lymphocytes Absolute 3.48 10*3/mm3       Monocytes Absolute 0.58 10*3/mm3      RBC Morphology Normal     WBC Morphology Normal     Platelet Morphology Normal    CBC Auto Differential [[id number]]  (Abnormal) Collected: 02/17/23 2257    Specimen: Blood Updated: 02/17/23 2332     WBC 14.50 10*3/mm3      RBC 5.24 10*6/mm3      Hemoglobin 14.6 g/dL      Hematocrit 45.8 %      MCV 87.4 fL      MCH 27.9 pg      MCHC 32.0 g/dL      RDW 14.4 %      RDW-SD 44.2 fl      MPV 7.9 fL      Platelets 285 10*3/mm3     Narrative:      The previously reported component NRBC is no longer being reported. Previous result was 0.0 /100 WBC (Reference Range: 0.0-0.2 /100 WBC) on 2/17/2023 at 2306 EST.    Comprehensive Metabolic Panel [[id number]]  (Abnormal) Collected: 02/17/23 2257    Specimen: Blood Updated: 02/17/23 2329     Glucose 160 mg/dL      BUN 15 mg/dL      Creatinine 0.76 mg/dL      Sodium 137 mmol/L      Potassium 4.3 mmol/L      Comment: Slight hemolysis detected by analyzer. Results may be affected.        Chloride 101 mmol/L      CO2 24.0 mmol/L      Calcium 9.1 mg/dL      Total Protein 6.9 g/dL      Albumin 4.4 g/dL      ALT (SGPT) 26 U/L      AST (SGOT) 15 U/L      Alkaline Phosphatase 84 U/L      Total Bilirubin 0.2 mg/dL      Globulin 2.5 gm/dL      A/G Ratio 1.8 g/dL      BUN/Creatinine Ratio 19.7     Anion Gap 12.0 mmol/L      eGFR 106.1 mL/min/1.73     Narrative:      GFR Normal >60  Chronic Kidney Disease <60  Kidney Failure <15      High Sensitivity Troponin T [[id number]]  (Normal) Collected: 02/17/23 2257    Specimen: Blood Updated: 02/17/23 2329     HS Troponin T <6 ng/L     Narrative:      High Sensitive Troponin T Reference Range:  <10.0 ng/L- Negative Female for AMI  <15.0 ng/L- Negative Male for AMI  >=10 - Abnormal Female indicating possible myocardial injury.  >=15 - Abnormal Male indicating possible myocardial injury.   Clinicians would have to utilize clinical acumen, EKG, Troponin, and serial changes to determine if it is an Acute Myocardial  Infarction or myocardial injury due to an underlying chronic condition.         Magnesium [[id number]]  (Normal) Collected: 02/17/23 2257    Specimen: Blood Updated: 02/17/23 2329     Magnesium 1.9 mg/dL     Protime-INR [[id number]]  (Normal) Collected: 02/17/23 2257    Specimen: Blood Updated: 02/17/23 2315     Protime 10.5 Seconds      INR 1.02    aPTT [[id number]]  (Abnormal) Collected: 02/17/23 2257    Specimen: Blood Updated: 02/17/23 2315     PTT 26.1 seconds           Imaging Results (Most Recent)     Procedure Component Value Units Date/Time    CT Angiogram Chest Pulmonary Embolism [[id number]] Collected: 02/17/23 2205     Updated: 02/18/23 0008    Narrative:      Exam: CT Angiography of the Chest.    Date: 2/17/2023.     Comparison: None    History: Chest pain radiating to back with diaphoresis.    Technique: CT examination of the chest was performed following the intravenous administration of 100 mL of Isovue-370 was given intravenously.. Sagittal, coronal and 3-D reformatted images were provided. CT dose lowering techniques were used, to include:   automated exposure control, adjustment for patient size, and/or use of iterative reconstruction.    FINDINGS:    Mediastinum and Emily: There is no axillary, mediastinal or hilar lymphadenopathy.    Pleural and Pericardial spaces: There are no pleural or pericardial effusions.    Upper Abdomen: There is a lobulated nodule within the right adrenal gland measuring 3.7 x 3.0 cm which is an indeterminate nodule. Recommend dedicated adrenal imaging for further characterization. The remainder the visualized upper abdomen appears   unremarkable.    Cardiovascular: The thoracic aorta is normal in size without evidence of aneurysm or dissection. Incidental note is made of an aberrant right subclavian artery.    Pulmonary Artery: There are no filling defects in the pulmonary arteries.    Lung Parenchyma and Airways: The lungs are clear.    Bones: No fracture or aggressive  osseous lesion.      Impression:        1. No evidence of pulmonary embolism.  2. No evidence of thoracic aortic aneurysm or dissection.  3. No evidence of pneumonia, pleural or pericardial effusions.  4. Indeterminate right adrenal nodule. Dedicated adrenal imaging is recommended for better characterization on a nonemergent basis.      Slot 93.      Electronically signed by:  Last Roach D.O.    2/17/2023 10:07 PM [place]    XR Chest 1 View [[id number]] Collected: 02/17/23 2201     Updated: 02/18/23 0003    Narrative:      EXAMINATION: XR CHEST 1 VW      DATE OF EXAM: 2/17/2023 10:42 PM    HISTORY: chest pain    COMPARISON: None.    FINDINGS:     Lungs are clear. Cardiomegaly. Degenerative spondylosis of the spine. Partially visualized cervical spinal fixation hardware. Upper abdomen is normal        Impression:        1.  . No acute cardiopulmonary abnormality.  2.  Cardiomegaly     Electronically signed by:  Tu Tovar M.D.    2/17/2023 10:02 PM [place]        reviewed    ECG/EMG Results (most recent)     Procedure Component Value Units Date/Time    ECG 12 Lead Chest Pain [[id number]] Collected: 02/17/23 2232     Updated: 02/17/23 2234     QT Interval 372 ms     Narrative:      HEART RATE= 68  bpm  RR Interval= 876  ms  KS Interval= 148  ms  P Horizontal Axis= -5  deg  P Front Axis= 63  deg  QRSD Interval= 89  ms  QT Interval= 372  ms  QRS Axis= 67  deg  T Wave Axis= 31  deg  - NORMAL ECG -  Sinus rhythm  Electronically Signed By:   Date and Time of Study: 2023-02-17 22:32:51        reviewed            Microbiology Results (last 10 days)     ** No results found for the last 240 hours. **          Assessment & Plan     Chest pain     Chest Pain   -Chest x-ray showed no acute cardiopulmonary process  -EKG showed sinus rhythm without ectopic changes  -CT chest is negative for pulmonary embolism or thoracic aneurysm, pulmonary effusion or pneumonia  -High sensitive troponins negative x2  -BNP  less than 36  -Magnesium 1.9  -Myoview shows low risk study with no ischemia and EF over 70%    Chronic essential hypertension  -Continue lisinopril hydrochlorothiazide  -Last pressure 132/87 with heart rate of 77    Generalized anxiety disorder  -Continue BuSpar  -Signs of HI or SI    Hyperlipidemia  -Continue simvastatin    GERD  -Continue PPI       I discussed the patients findings and my recommendations with patient and family.     Discharge Diagnosis:      Chest pain      Hospital Course  Patient is a 57 y.o. male presented with chest pain.  Patient presented with left-sided chest pain with associated symptoms of dyspnea, nausea, vomiting, fever or syncope.  Patient denies history of PE or DVT.  High-sensitivity troponin negative x2.  BNP less than 36.  CMP unremarkable except for slightly elevated glucose at 160.  Magnesium 1.9.  CBC shows slightly elevated white blood cell count of 14.5 with increased absolute neutrophil count.  Patient denied fever or ill known contacts recently.  Chest x-ray showed no acute cardiopulmonary abnormalities with slight cardiomegaly.  CTA chest showed no evidence of pulmonary embolism, thoracic aortic aneurysm or dissection, pneumonia or pleural effusion.  CT chest did show indeterminate right adrenal nodule and follow-up outpatient on nonemergent basis with PCP.  EKG shows sinus rhythm with heart rate 68.  Stress test showed EF of over 70% with no ischemia and low risk study.  Patient to monitor blood pressure at home is a slightly elevated pressures during admission.  Patient to follow-up PCP in 1 week for continued care management.  Tests and recommendations reviewed with patient and family and they agree with treatment plan.  If symptoms worsen patient to call 911 or go to nearest ED.    Past Medical History:     Past Medical History:   Diagnosis Date   • Back pain    • Hypertension    • IBS (irritable bowel syndrome)    • PONV (postoperative nausea and vomiting)    • Sleep  apnea, obstructive        Past Surgical History:     Past Surgical History:   Procedure Laterality Date   • CARPAL TUNNEL RELEASE Bilateral 2014   • CERVICAL DISC SURGERY  2015   • COLONOSCOPY W/ POLYPECTOMY  07/18/2018   • LUMBAR DISCECTOMY FUSION INSTRUMENTATION Right 9/13/2017    Procedure: Right L3 4 laminectomy and discectomy with Metrix;  Surgeon: Eric Dickson MD;  Location: [facility];  Service:        Social History:   Social History     Socioeconomic History   • Marital status:    • Number of children: 2   • Years of education: [employer]   Tobacco Use   • Smoking status: Never   • Smokeless tobacco: Never   Vaping Use   • Vaping Use: Never used   Substance and Sexual Activity   • Alcohol use: No   • Drug use: No   • Sexual activity: Yes     Partners: Female       Procedures Performed         Consults:   Consults     Date and Time Order Name Status Description    2/18/2023  1:29 AM Inpatient Cardiology Consult Completed           Condition on Discharge:     Stable    Discharge Disposition  Home or Self Care    Discharge Medications     Discharge Medications      Continue These Medications      Instructions Start Date   busPIRone 15 MG tablet  Commonly known as: BUSPAR   15 mg, Oral, Nightly      busPIRone 5 MG tablet  Commonly known as: BUSPAR   7.5 mg, Oral, Daily      clotrimazole 1 % cream  Commonly known as: LOTRIMIN   1 application, Daily, Groin      desonide 0.05 % cream  Commonly known as: DESOWEN   1 application, Daily PRN, Use as directed      dicyclomine 20 MG tablet  Commonly known as: BENTYL   TAKE 1 TABLET BY MOUTH EVERY 6 HOURS AS NEEDED FOR CRAMPS      hydrocortisone 2.5 % cream   MIX WITH CLOTRIMAZOLE AND APPLY TWICE DAILY      ibuprofen 200 MG tablet  Commonly known as: ADVIL,MOTRIN   200 mg, Oral, Every 6 Hours PRN      levocetirizine 5 MG tablet  Commonly known as: XYZAL   5 mg, Oral, Daily PRN      lisinopril-hydrochlorothiazide 10-12.5 MG per tablet  Commonly known  as: PRINZIDE,ZESTORETIC   1 tablet, Oral, Daily      methscopolamine 5 MG tablet  Commonly known as: PAMINE FORTE   2.5 mg, Oral, Daily PRN      mometasone 50 MCG/ACT nasal spray  Commonly known as: NASONEX   2 sprays, Nasal, Daily PRN      pantoprazole 40 MG EC tablet  Commonly known as: PROTONIX   40 mg, Oral, Daily      simvastatin 40 MG tablet  Commonly known as: ZOCOR   40 mg, Oral, Daily      Stahist AD 25-60 MG tablet  Generic drug: Chlorcyclizine-Pseudoephed   1 tablet, Oral, Daily PRN             Discharge Diet:   Diet Instructions     Diet: Cardiac      Discharge Diet: Cardiac          Activity at Discharge:   Activity Instructions     Activity as Tolerated      Measure Blood Pressure            Follow-up Appointments  No future appointments.  Additional Instructions for the Follow-ups that You Need to Schedule     Discharge Follow-up with PCP   As directed       Currently Documented PCP:    Abraham Montoya MD    PCP Phone Number:    [phone]     Follow Up Details: 7-10 days               Test Results Pending at Discharge       Risk for Readmission (LACE) Score: 1 (2/18/2023  6:00 AM)      I spent 30 minutes caring for Enrike on this date of service. This time includes time spent by me in the following activities: reviewing tests, obtaining and/or reviewing a separately obtained history, performing a medically appropriate examination and/or evaluation, counseling and educating the patient/family/caregiver, ordering medications, tests, or procedures, referring and communicating with other health care professionals, documenting information in the medical record, independently interpreting results and communicating that information with the patient/family/caregiver and care coordination.       Krystal Zaragoza, TRI  02/18/23  14:01 EST

## 2023-02-18 NOTE — PLAN OF CARE
Goal Outcome Evaluation:  Plan of Care Reviewed With: patient        Progress: improving  Outcome Evaluation: Pt myoview negative. Will d/c to home.

## 2023-02-18 NOTE — CONSULTS
[facility] CARDIOLOGY CONSULT  [facility]        Subjective:     Encounter Date:02/17/2023      Patient ID: Enrike Price is a 57 y.o. male.    Chief Complaint: CP      HPI:  Enrike Price is a 57 y.o. male with no prior cardiac history but with a PMH of HTN, HLD, IBS, and AUDREY (uses CPAP).  He presents with c/o chest pain, and cardiology was consulted for further evaluation.  Patient reports a new onset of generalized anterior chest tightness radiating to his back behind his right shoulder and accompanied by sweating and belching.  This occurred after dinner and persisted for hours.  He reports that he thought the nitropaste in the ER helped.  He denies dyspnea.  He is not very active d/t joint issues but can typically climb a flight of stairs without difficulty and works long hours as a nurse anesthetist.  He denies any dizziness or pre or yann syncope.  He denies any PND/orthopnea or edema.  He reports a lot a stress recently.  Aspirin is listed as drug allergy but he tells me that this is not a true allergy; it just hurts his stomach.        Past Medical History:   Diagnosis Date   • Back pain    • Hypertension    • IBS (irritable bowel syndrome)    • PONV (postoperative nausea and vomiting)    • Sleep apnea, obstructive          Past Surgical History:   Procedure Laterality Date   • CARPAL TUNNEL RELEASE Bilateral 2014   • CERVICAL DISC SURGERY  2015   • COLONOSCOPY W/ POLYPECTOMY  07/18/2018   • LUMBAR DISCECTOMY FUSION INSTRUMENTATION Right 9/13/2017    Procedure: Right L3 4 laminectomy and discectomy with Metrix;  Surgeon: Eric Dickson MD;  Location: [facility];  Service:          Social History     Socioeconomic History   • Marital status:    • Number of children: 2   • Years of education: [employer]   Tobacco Use   • Smoking status: Never   • Smokeless tobacco: Never   Vaping Use   • Vaping Use: Never used   Substance and Sexual Activity   •  "Alcohol use: No   • Drug use: No   • Sexual activity: Yes     Partners: Female       Family History   Problem Relation Age of Onset   • No Known Problems Mother    • No Known Problems Father    • No Known Problems Sister    • Malig Hyperthermia Neg Hx          Allergies   Allergen Reactions   • Aspirin GI Intolerance   • Baclofen Other (See Comments)     Per patient caused nausea and vomiting, diarrhea, and \"jerking motions with hands/dropping items\"  Per patient symptoms resolved when stopped taking baclofen       Current Medications:   Scheduled Meds:atorvastatin, 20 mg, Oral, Daily  busPIRone, 15 mg, Oral, Nightly  busPIRone, 7.5 mg, Oral, Daily  calcium carbonate, 1 tablet, Oral, Once  cetirizine, 10 mg, Oral, Daily  lisinopril, 10 mg, Oral, Q24H   And  hydroCHLOROthiazide, 12.5 mg, Oral, Q24H  pantoprazole, 40 mg, Oral, Daily  sodium chloride, 10 mL, Intravenous, Q12H      Continuous Infusions:     ROS  All other systems reviewed and are negative.       Objective:         /80 (BP Location: Left arm, Patient Position: Lying)   Pulse 109   Temp 98.8 °F (37.1 °C) (Oral)   Resp 16   Ht 172.7 cm (68\")   Wt 109 kg (241 lb 1.6 oz)   SpO2 99%   BMI 36.66 kg/m²       General: Well-developed in NAD.  Neuro: AAOx3. No gross deficits.  HEENT: sclera clear, no xanthalasmas.  CV: S1S2 RRR. No murmurs or gallops. No JVD.  Resp: Breathing is unlabored. Lungs CTA throughout.  GI: BS+. Abdomen soft and NTTP.  Ext: Pedal pulses are palpable. Extremities are non-edematous.  MS: moves all extremities, no weakness.  Skin: warm, dry.  Psych: calm and cooperative.            Lab Review:     Results from last 7 days   Lab Units 02/17/23  2257   SODIUM mmol/L 137   POTASSIUM mmol/L 4.3   CHLORIDE mmol/L 101   CO2 mmol/L 24.0   BUN mg/dL 15   CREATININE mg/dL 0.76   GLUCOSE mg/dL 160*   CALCIUM mg/dL 9.1   AST (SGOT) U/L 15   ALT (SGPT) U/L 26     Results from last 7 days   Lab Units 02/18/23  0104 02/17/23  2257   HSTROP T " ng/L <6 <6     Results from last 7 days   Lab Units 02/17/23 2257   WBC 10*3/mm3 14.50*   HEMOGLOBIN g/dL 14.6   HEMATOCRIT % 45.8   PLATELETS 10*3/mm3 285     Results from last 7 days   Lab Units 02/17/23 2257   INR  1.02   APTT seconds 26.1*     Results from last 7 days   Lab Units 02/17/23 2257   MAGNESIUM mg/dL 1.9           Invalid input(s): LDLCALC  Results from last 7 days   Lab Units 02/18/23  0104   PROBNP pg/mL <36.0           Recent Radiology:  Imaging Results (Most Recent)     Procedure Component Value Units Date/Time    CT Angiogram Chest Pulmonary Embolism [[id number]] Collected: 02/17/23 2205     Updated: 02/18/23 0008    Narrative:      Exam: CT Angiography of the Chest.    Date: 2/17/2023.     Comparison: None    History: Chest pain radiating to back with diaphoresis.    Technique: CT examination of the chest was performed following the intravenous administration of 100 mL of Isovue-370 was given intravenously.. Sagittal, coronal and 3-D reformatted images were provided. CT dose lowering techniques were used, to include:   automated exposure control, adjustment for patient size, and/or use of iterative reconstruction.    FINDINGS:    Mediastinum and Emily: There is no axillary, mediastinal or hilar lymphadenopathy.    Pleural and Pericardial spaces: There are no pleural or pericardial effusions.    Upper Abdomen: There is a lobulated nodule within the right adrenal gland measuring 3.7 x 3.0 cm which is an indeterminate nodule. Recommend dedicated adrenal imaging for further characterization. The remainder the visualized upper abdomen appears   unremarkable.    Cardiovascular: The thoracic aorta is normal in size without evidence of aneurysm or dissection. Incidental note is made of an aberrant right subclavian artery.    Pulmonary Artery: There are no filling defects in the pulmonary arteries.    Lung Parenchyma and Airways: The lungs are clear.    Bones: No fracture or aggressive osseous lesion.       Impression:        1. No evidence of pulmonary embolism.  2. No evidence of thoracic aortic aneurysm or dissection.  3. No evidence of pneumonia, pleural or pericardial effusions.  4. Indeterminate right adrenal nodule. Dedicated adrenal imaging is recommended for better characterization on a nonemergent basis.      Slot 93.      Electronically signed by:  Last Roach D.O.    2/17/2023 10:07 PM Mountain Time    XR Chest 1 View [[id number]] Collected: 02/17/23 2201     Updated: 02/18/23 0003    Narrative:      EXAMINATION: XR CHEST 1 VW      DATE OF EXAM: 2/17/2023 10:42 PM    HISTORY: chest pain    COMPARISON: None.    FINDINGS:     Lungs are clear. Cardiomegaly. Degenerative spondylosis of the spine. Partially visualized cervical spinal fixation hardware. Upper abdomen is normal        Impression:        1.  . No acute cardiopulmonary abnormality.  2.  Cardiomegaly     Electronically signed by:  Tu Tovar M.D.    2/17/2023 10:02 PM Mountain Time            ECHOCARDIOGRAM:              Assessment:         Active Hospital Problems    Diagnosis  POA   • **Chest pain [R07.9]  Yes     1) Chest Pain  - troponin negative x 2  - EKG shows no acute ST abnormalities  - CXR shows no acute findings  - CTA chest shows no PE or other acute findings    2) HTN    3) HLD    4) IBS    5) AUDREY  - uses CPAP         Plan:   Patient has completed exercise nuclear stress test with non-limiting chest pain during exercise and no acute ST abnormalities.  Nuclear images to follow.  If testing is normal consider GI etiology.  Further recommendations per attending cardiologist.       Electronically signed by TRI Lafleur, 02/18/23, 10:18 AM EST.

## 2023-02-18 NOTE — ED PROVIDER NOTES
"Subjective   History of Present Illness  Chief complaint: Patient is a pleasant 57-year-old who presents to the emergency room with acute onset chest pain.  2 hours prior to arrival he had sudden onset chest pain.  A tightness all across the front of his chest.  He feels it in his back as well.  It waxes and wanes.  It started suddenly when he was having dinner.  No abdominal pain or vomiting.  He has had some sweating but no shortness of breath with it.  Leading up to this he felt fine.  This happened suddenly.  He tried his anxiety medicine.  Has had a lot of stress lately.  He thought may be anxiety was a problem.  That did not help.  He tried 2 different antacids.  However he is never had pain like this with his acid reflux before had pain like this before.  He has never had a stress test before.  He does have a history of hypertension and hyperlipidemia.  He has never had a pulmonary embolism.  No recent long trip.  No swelling in his legs.    Context:    Duration: 2 hours    Timing: Sudden onset    Severity:    Associated Symptoms:        PCP:  LMP:        Review of Systems   Constitutional: Positive for diaphoresis.   HENT: Negative.    Respiratory: Positive for chest tightness. Negative for shortness of breath.    Cardiovascular: Positive for chest pain.   Gastrointestinal: Negative for abdominal pain.   Musculoskeletal: Positive for back pain.       Past Medical History:   Diagnosis Date   • Back pain    • Hypertension    • IBS (irritable bowel syndrome)    • PONV (postoperative nausea and vomiting)    • Sleep apnea, obstructive        Allergies   Allergen Reactions   • Aspirin GI Intolerance   • Baclofen Other (See Comments)     Per patient caused nausea and vomiting, diarrhea, and \"jerking motions with hands/dropping items\"  Per patient symptoms resolved when stopped taking baclofen       Past Surgical History:   Procedure Laterality Date   • CARPAL TUNNEL RELEASE Bilateral 2014   • CERVICAL DISC SURGERY  " 2015   • COLONOSCOPY W/ POLYPECTOMY  07/18/2018   • LUMBAR DISCECTOMY FUSION INSTRUMENTATION Right 9/13/2017    Procedure: Right L3 4 laminectomy and discectomy with Metrix;  Surgeon: Eric Dickson MD;  Location: [facility];  Service:        Family History   Problem Relation Age of Onset   • No Known Problems Mother    • No Known Problems Father    • No Known Problems Sister    • Malig Hyperthermia Neg Hx        Social History     Socioeconomic History   • Marital status:    • Number of children: 2   • Years of education: [employer]   Tobacco Use   • Smoking status: Never   • Smokeless tobacco: Never   Vaping Use   • Vaping Use: Never used   Substance and Sexual Activity   • Alcohol use: No   • Drug use: No   • Sexual activity: Yes     Partners: Female           Objective   Physical Exam  Vitals and nursing note reviewed.   Constitutional:       Appearance: He is well-developed.   HENT:      Head: Normocephalic and atraumatic.   Neck:      Vascular: No JVD.   Cardiovascular:      Rate and Rhythm: Normal rate and regular rhythm.      Heart sounds: Normal heart sounds.   Pulmonary:      Breath sounds: Normal breath sounds.   Abdominal:      Palpations: Abdomen is soft.      Tenderness: There is no abdominal tenderness.   Musculoskeletal:      Right lower leg: No tenderness. No edema.      Left lower leg: No tenderness. No edema.   Skin:     General: Skin is warm and dry.   Neurological:      General: No focal deficit present.      Mental Status: He is alert and oriented to person, place, and time.   Psychiatric:         Mood and Affect: Mood normal.         Behavior: Behavior normal.         Procedures           ED Course      Results for orders placed or performed during the hospital encounter of 02/17/23   Comprehensive Metabolic Panel    Specimen: Blood   Result Value Ref Range    Glucose 160 (H) 65 - 99 mg/dL    BUN 15 6 - 20 mg/dL    Creatinine 0.76 0.76 - 1.27 mg/dL    Sodium 137 136 - 145  mmol/L    Potassium 4.3 3.5 - 5.2 mmol/L    Chloride 101 98 - 107 mmol/L    CO2 24.0 22.0 - 29.0 mmol/L    Calcium 9.1 8.6 - 10.5 mg/dL    Total Protein 6.9 6.0 - 8.5 g/dL    Albumin 4.4 3.5 - 5.2 g/dL    ALT (SGPT) 26 1 - 41 U/L    AST (SGOT) 15 1 - 40 U/L    Alkaline Phosphatase 84 39 - 117 U/L    Total Bilirubin 0.2 0.0 - 1.2 mg/dL    Globulin 2.5 gm/dL    A/G Ratio 1.8 g/dL    BUN/Creatinine Ratio 19.7 7.0 - 25.0    Anion Gap 12.0 5.0 - 15.0 mmol/L    eGFR 106.1 >60.0 mL/min/1.73   Protime-INR    Specimen: Blood   Result Value Ref Range    Protime 10.5 9.6 - 11.7 Seconds    INR 1.02 0.93 - 1.10   aPTT    Specimen: Blood   Result Value Ref Range    PTT 26.1 (L) 61.0 - 76.5 seconds   High Sensitivity Troponin T    Specimen: Blood   Result Value Ref Range    HS Troponin T <6 <15 ng/L   Magnesium    Specimen: Blood   Result Value Ref Range    Magnesium 1.9 1.6 - 2.6 mg/dL   CBC Auto Differential    Specimen: Blood   Result Value Ref Range    WBC 14.50 (H) 3.40 - 10.80 10*3/mm3    RBC 5.24 4.14 - 5.80 10*6/mm3    Hemoglobin 14.6 13.0 - 17.7 g/dL    Hematocrit 45.8 37.5 - 51.0 %    MCV 87.4 79.0 - 97.0 fL    MCH 27.9 26.6 - 33.0 pg    MCHC 32.0 31.5 - 35.7 g/dL    RDW 14.4 12.3 - 15.4 %    RDW-SD 44.2 37.0 - 54.0 fl    MPV 7.9 6.0 - 12.0 fL    Platelets 285 140 - 450 10*3/mm3   Scan Slide    Specimen: Blood   Result Value Ref Range    Scan Slide     Manual Differential    Specimen: Blood   Result Value Ref Range    Neutrophil % 71.0 42.7 - 76.0 %    Lymphocyte % 24.0 19.6 - 45.3 %    Monocyte % 4.0 (L) 5.0 - 12.0 %    Bands %  1.0 0.0 - 5.0 %    Neutrophils Absolute 10.44 (H) 1.70 - 7.00 10*3/mm3    Lymphocytes Absolute 3.48 (H) 0.70 - 3.10 10*3/mm3    Monocytes Absolute 0.58 0.10 - 0.90 10*3/mm3    RBC Morphology Normal Normal    WBC Morphology Normal Normal    Platelet Morphology Normal Normal   ECG 12 Lead Chest Pain   Result Value Ref Range    QT Interval 372 ms     XR Chest 1 View    Result Date: 2/18/2023  1.  .  No acute cardiopulmonary abnormality. 2.  Cardiomegaly Electronically signed by:  Tu Tovar M.D.  2/17/2023 10:02 PM Mountain Time    CT Angiogram Chest Pulmonary Embolism    Result Date: 2/18/2023  1. No evidence of pulmonary embolism. 2. No evidence of thoracic aortic aneurysm or dissection. 3. No evidence of pneumonia, pleural or pericardial effusions. 4. Indeterminate right adrenal nodule. Dedicated adrenal imaging is recommended for better characterization on a nonemergent basis. Slot 93. Electronically signed by:  Last Roach D.O.  2/17/2023 10:07 PM Mountain Time                                         Medical Decision Making  Patient was seen and evaluated for chest pain    Differential diagnosis includes but is not limited to ACS, pneumothorax, pneumonia, PE, dissection, pleurisy, pericarditis, acid reflux    Patient was seen and evaluated.  EKG shows sinus rhythm rate of 68.  First troponin is normal.  Patient had a chest CT chest obtained.  No pneumothorax.  I ordered this secondary to the fact the patient had pain through to his back.  I was concerned about aneurysm or dissection.  This was negative.  No PE as well.  No signs of pneumonia.  His EKG looks okay and this could be acid reflux.  He has no abdominal pain on exam.  He has a moderate heart score.  I discussed placement in the observation unit for stress testing.  Patient verbalized understanding is okay with this.  If negative potentially gastroenterology component to causing this as gastritis.  May need gastroenterology evaluation if stress test is negative potentially on outpatient basis versus inpatient depending on patient's progression through stay here in the hospital.  CT abdomen was considered with history of g gastroesophageal reflux disease.  However abdomen was soft and nontender on exam.    Chest pain, unspecified type: acute illness or injury  Amount and/or Complexity of Data Reviewed  Labs: ordered. Decision-making  details documented in ED Course.     Details: Troponin negative  Radiology: ordered and independent interpretation performed. Decision-making details documented in ED Course.     Details: CT chest shows no aneurysm, no dissection, no PE evaluated by myself  ECG/medicine tests: independent interpretation performed.     Details: As above      Risk  OTC drugs.  Prescription drug management.  Decision regarding hospitalization.          Final diagnoses:   None   Chest pain    ED Disposition  ED Disposition     None          No follow-up provider specified.       Medication List      No changes were made to your prescriptions during this visit.          Iam Barlow DO  02/18/23 0049

## 2023-02-19 LAB — QT INTERVAL: 372 MS

## 2023-02-23 ENCOUNTER — OFFICE (OUTPATIENT)
Dept: URBAN - METROPOLITAN AREA CLINIC 64 | Facility: CLINIC | Age: 58
End: 2023-02-23
Payer: COMMERCIAL

## 2023-02-23 VITALS
WEIGHT: 243 LBS | DIASTOLIC BLOOD PRESSURE: 83 MMHG | HEIGHT: 68 IN | SYSTOLIC BLOOD PRESSURE: 130 MMHG | HEART RATE: 99 BPM

## 2023-02-23 DIAGNOSIS — R07.89 OTHER CHEST PAIN: ICD-10-CM

## 2023-02-23 DIAGNOSIS — K21.9 GASTRO-ESOPHAGEAL REFLUX DISEASE WITHOUT ESOPHAGITIS: ICD-10-CM

## 2023-02-23 DIAGNOSIS — Z86.010 PERSONAL HISTORY OF COLONIC POLYPS: ICD-10-CM

## 2023-02-23 PROCEDURE — 99214 OFFICE O/P EST MOD 30 MIN: CPT

## 2023-02-23 RX ORDER — ESOMEPRAZOLE MAGNESIUM 40 MG/1
80 CAPSULE, DELAYED RELEASE ORAL
Qty: 180 | Refills: 3 | Status: ACTIVE
Start: 2023-02-23

## 2023-06-02 RX ORDER — ESOMEPRAZOLE MAGNESIUM 40 MG/1
40 CAPSULE, DELAYED RELEASE ORAL
COMMUNITY

## 2023-12-01 ENCOUNTER — ON CAMPUS - OUTPATIENT (OUTPATIENT)
Dept: URBAN - METROPOLITAN AREA HOSPITAL 85 | Facility: HOSPITAL | Age: 58
End: 2023-12-01
Payer: COMMERCIAL

## 2023-12-01 ENCOUNTER — ANESTHESIA (OUTPATIENT)
Dept: GASTROENTEROLOGY | Facility: HOSPITAL | Age: 58
End: 2023-12-01
Payer: COMMERCIAL

## 2023-12-01 ENCOUNTER — HOSPITAL ENCOUNTER (OUTPATIENT)
Facility: HOSPITAL | Age: 58
Setting detail: HOSPITAL OUTPATIENT SURGERY
Discharge: HOME | End: 2023-12-01
Attending: INTERNAL MEDICINE | Admitting: INTERNAL MEDICINE
Payer: COMMERCIAL

## 2023-12-01 ENCOUNTER — ANESTHESIA EVENT (OUTPATIENT)
Dept: GASTROENTEROLOGY | Facility: HOSPITAL | Age: 58
End: 2023-12-01
Payer: COMMERCIAL

## 2023-12-01 VITALS
TEMPERATURE: 98.2 F | WEIGHT: 234.79 LBS | RESPIRATION RATE: 15 BRPM | DIASTOLIC BLOOD PRESSURE: 57 MMHG | OXYGEN SATURATION: 96 % | SYSTOLIC BLOOD PRESSURE: 107 MMHG | BODY MASS INDEX: 35.58 KG/M2 | HEART RATE: 76 BPM | HEIGHT: 68 IN

## 2023-12-01 DIAGNOSIS — D12.3 BENIGN NEOPLASM OF TRANSVERSE COLON: ICD-10-CM

## 2023-12-01 DIAGNOSIS — Z86.010 PERSONAL HISTORY OF COLONIC POLYPS: ICD-10-CM

## 2023-12-01 DIAGNOSIS — R07.89 ATYPICAL CHEST PAIN: ICD-10-CM

## 2023-12-01 DIAGNOSIS — K57.30 DIVERTICULOSIS OF LARGE INTESTINE WITHOUT PERFORATION OR ABS: ICD-10-CM

## 2023-12-01 DIAGNOSIS — D12.2 BENIGN NEOPLASM OF ASCENDING COLON: ICD-10-CM

## 2023-12-01 DIAGNOSIS — K63.3 ULCER OF INTESTINE: ICD-10-CM

## 2023-12-01 DIAGNOSIS — Z09 ENCOUNTER FOR FOLLOW-UP EXAMINATION AFTER COMPLETED TREATMEN: ICD-10-CM

## 2023-12-01 DIAGNOSIS — K21.9 GERD (GASTROESOPHAGEAL REFLUX DISEASE): ICD-10-CM

## 2023-12-01 DIAGNOSIS — K21.9 GASTRO-ESOPHAGEAL REFLUX DISEASE WITHOUT ESOPHAGITIS: ICD-10-CM

## 2023-12-01 DIAGNOSIS — K31.7 POLYP OF STOMACH AND DUODENUM: ICD-10-CM

## 2023-12-01 DIAGNOSIS — R07.89 OTHER CHEST PAIN: ICD-10-CM

## 2023-12-01 PROCEDURE — 25010000002 PROPOFOL 500 MG/50ML EMULSION: Performed by: NURSE ANESTHETIST, CERTIFIED REGISTERED

## 2023-12-01 PROCEDURE — 45380 COLONOSCOPY AND BIOPSY: CPT | Mod: 59,33 | Performed by: INTERNAL MEDICINE

## 2023-12-01 PROCEDURE — 25810000003 SODIUM CHLORIDE 0.9 % SOLUTION: Performed by: NURSE ANESTHETIST, CERTIFIED REGISTERED

## 2023-12-01 PROCEDURE — 43251 EGD REMOVE LESION SNARE: CPT | Performed by: INTERNAL MEDICINE

## 2023-12-01 PROCEDURE — 45385 COLONOSCOPY W/LESION REMOVAL: CPT | Mod: 33 | Performed by: INTERNAL MEDICINE

## 2023-12-01 PROCEDURE — 45380 COLONOSCOPY AND BIOPSY: CPT | Mod: 33,59 | Performed by: INTERNAL MEDICINE

## 2023-12-01 PROCEDURE — 88305 TISSUE EXAM BY PATHOLOGIST: CPT | Performed by: INTERNAL MEDICINE

## 2023-12-01 RX ORDER — HYDRALAZINE HYDROCHLORIDE 20 MG/ML
5 INJECTION INTRAMUSCULAR; INTRAVENOUS
Status: DISCONTINUED | OUTPATIENT
Start: 2023-12-01 | End: 2023-12-01 | Stop reason: HOSPADM

## 2023-12-01 RX ORDER — LABETALOL HYDROCHLORIDE 5 MG/ML
5 INJECTION, SOLUTION INTRAVENOUS
Status: DISCONTINUED | OUTPATIENT
Start: 2023-12-01 | End: 2023-12-01 | Stop reason: HOSPADM

## 2023-12-01 RX ORDER — PROPOFOL 10 MG/ML
INJECTION, EMULSION INTRAVENOUS AS NEEDED
Status: DISCONTINUED | OUTPATIENT
Start: 2023-12-01 | End: 2023-12-01 | Stop reason: SURG

## 2023-12-01 RX ORDER — SODIUM CHLORIDE 9 MG/ML
INJECTION, SOLUTION INTRAVENOUS CONTINUOUS PRN
Status: DISCONTINUED | OUTPATIENT
Start: 2023-12-01 | End: 2023-12-01 | Stop reason: SURG

## 2023-12-01 RX ORDER — LIDOCAINE HYDROCHLORIDE 20 MG/ML
INJECTION, SOLUTION INFILTRATION; PERINEURAL AS NEEDED
Status: DISCONTINUED | OUTPATIENT
Start: 2023-12-01 | End: 2023-12-01 | Stop reason: SURG

## 2023-12-01 RX ORDER — IPRATROPIUM BROMIDE AND ALBUTEROL SULFATE 2.5; .5 MG/3ML; MG/3ML
3 SOLUTION RESPIRATORY (INHALATION) ONCE AS NEEDED
Status: DISCONTINUED | OUTPATIENT
Start: 2023-12-01 | End: 2023-12-01 | Stop reason: HOSPADM

## 2023-12-01 RX ORDER — ONDANSETRON 2 MG/ML
4 INJECTION INTRAMUSCULAR; INTRAVENOUS ONCE AS NEEDED
Status: DISCONTINUED | OUTPATIENT
Start: 2023-12-01 | End: 2023-12-01 | Stop reason: HOSPADM

## 2023-12-01 RX ORDER — ACETAMINOPHEN 325 MG/1
650 TABLET ORAL ONCE AS NEEDED
Status: DISCONTINUED | OUTPATIENT
Start: 2023-12-01 | End: 2023-12-01 | Stop reason: HOSPADM

## 2023-12-01 RX ORDER — EPHEDRINE SULFATE 5 MG/ML
5 INJECTION INTRAVENOUS ONCE AS NEEDED
Status: DISCONTINUED | OUTPATIENT
Start: 2023-12-01 | End: 2023-12-01 | Stop reason: HOSPADM

## 2023-12-01 RX ADMIN — PROPOFOL 30 MG: 10 INJECTION, EMULSION INTRAVENOUS at 12:53

## 2023-12-01 RX ADMIN — PROPOFOL 30 MG: 10 INJECTION, EMULSION INTRAVENOUS at 12:42

## 2023-12-01 RX ADMIN — PROPOFOL 150 MG: 10 INJECTION, EMULSION INTRAVENOUS at 12:39

## 2023-12-01 RX ADMIN — PROPOFOL 30 MG: 10 INJECTION, EMULSION INTRAVENOUS at 13:02

## 2023-12-01 RX ADMIN — PROPOFOL 30 MG: 10 INJECTION, EMULSION INTRAVENOUS at 12:45

## 2023-12-01 RX ADMIN — SODIUM CHLORIDE: 9 INJECTION, SOLUTION INTRAVENOUS at 12:31

## 2023-12-01 RX ADMIN — PROPOFOL 30 MG: 10 INJECTION, EMULSION INTRAVENOUS at 12:49

## 2023-12-01 RX ADMIN — PROPOFOL 30 MG: 10 INJECTION, EMULSION INTRAVENOUS at 12:57

## 2023-12-01 RX ADMIN — LIDOCAINE HYDROCHLORIDE 100 MG: 20 INJECTION, SOLUTION INFILTRATION; PERINEURAL at 12:39

## 2023-12-01 NOTE — ANESTHESIA POSTPROCEDURE EVALUATION
Patient: Enrike Price    Procedure Summary       Date: 12/01/23 Room / Location: [facility] / [facility]    Anesthesia Start: 1231 Anesthesia Stop: 1319    Procedures:       ESOPHAGOGASTRODUODENOSCOPY with polypectomies      COLONOSCOPY Diagnosis:       Atypical chest pain      GERD (gastroesophageal reflux disease)      Personal history of colonic polyps      (Atypical chest pain [R07.89])      (GERD (gastroesophageal reflux disease) [K21.9])      (Personal history of colonic polyps [Z86.010])    Surgeons: Andres Jackson MD Provider: Dane Peralta MD    Anesthesia Type: general ASA Status: 3            Anesthesia Type: general    Vitals  Vitals Value Taken Time   /56 12/01/23 1336   Temp     Pulse 78 12/01/23 1339   Resp 15 12/01/23 1330   SpO2 94 % 12/01/23 1339   Vitals shown include unfiled device data.        Post Anesthesia Care and Evaluation    Patient location during evaluation: PHASE II  Patient participation: complete - patient participated  Level of consciousness: awake  Pain score: 1  Pain management: satisfactory to patient  Anesthetic complications: No anesthetic complications  PONV Status: none  Cardiovascular status: acceptable  Respiratory status: acceptable  Hydration status: acceptable

## 2023-12-01 NOTE — ANESTHESIA PREPROCEDURE EVALUATION
Anesthesia Evaluation     Patient summary reviewed and Nursing notes reviewed   history of anesthetic complications:  PONV  NPO Solid Status: > 8 hours             Airway   Mallampati: II  TM distance: >3 FB  Neck ROM: full  No difficulty expected  Dental - normal exam     Pulmonary - normal exam   (+) ,sleep apnea on CPAP  (-) not a smoker  Cardiovascular - normal exam    ECG reviewed    (+) hypertension, hyperlipidemia  (-) angina, KENDRICK      Neuro/Psych- negative ROS  GI/Hepatic/Renal/Endo    (+) GERD    Musculoskeletal (-) negative ROS    Abdominal  - normal exam    Bowel sounds: normal.   Substance History - negative use     OB/GYN negative ob/gyn ROS         Other                    Anesthesia Plan    ASA 3     general       Anesthetic plan, risks, benefits, and alternatives have been provided, discussed and informed consent has been obtained with: patient.    CODE STATUS:

## 2023-12-01 NOTE — DISCHARGE INSTRUCTIONS
A responsible adult should stay with you and you should rest quietly for the rest of the day.    Do not drink alcohol, drive, operate any heavy machinery or power tools or make any legal/important decisions for the next 24 hours.     Progress your diet as tolerated.  If you begin to experience severe pain, increased shortness of breath, racing heartbeat or a fever above 101 F, seek immediate medical attention.     Follow up with MD as instructed. Call office for results in 3 to 5 days if needed.     Dr Jackson [phone]      EGD findings:  1.  Normal esophageal mucosa entire esophagus with no sign of hiatal hernia.  2.  Numerous gastric polyps all greater than 1 cm and some nearly 2 cm with oozing from multiple polyps and blood in the stomach, hot snare polypectomies were performed on over 20 of the largest polyps with hemostasis achieved.  A rescue net was used to retrieve the polyps  3.  Normal duodenal mucosa visualized to D2     Colonoscopy findings:  1.  Diverticulosis was mild with small openings in the sigmoid colon  2.  A single ulcer in the transverse colon that was 4 mm and superficial, cold forcep biopsies were taken  3.  1 polyp in the transverse colon that was 5 mm and sessile removed via cold snare  4.  1 polyp in the descending colon that was 5 mm and sessile removed via cold snare        Recommendations:  Follow-up biopsy results  Recommend TIF workup with Dr. Marquez given the patient's 15 to 20-year history of PPI use and still refractory with continued GERD and heartburn.  Repeat colonoscopy in 5 years

## 2023-12-01 NOTE — OP NOTE
ESOPHAGOGASTRODUODENOSCOPY, COLONOSCOPY Procedure Report    Patient Name:  Enrike Price  YOB: 1965    Date of Surgery:  12/1/2023     Pre-Op Diagnosis:  Atypical chest pain [R07.89]  GERD (gastroesophageal reflux disease) [K21.9]  Personal history of colonic polyps [Z86.010]       Postop diagnosis:  1.  Gastric polyps  2.  Colon ulcer  3.  Colon polyp  4.  Diverticulosis      Procedure/CPT® Codes:      Procedure(s):  ESOPHAGOGASTRODUODENOSCOPY with snare polypectomy  COLONOSCOPY with biopsy plus snare    Staff:  Surgeon(s):  Andres Jackson MD      Anesthesia: Monitored Anesthesia Care    Description of Procedure:  A description of the procedure as well as risks, benefits and alternative methods were explained to the patient who voiced understanding and signed the corresponding consent form. A physical exam was performed and vital signs were monitored throughout the procedure.    An upper GI endoscope was placed into the mouth and proceeded through the esophagus, stomach and second portion of the duodenum without difficulty. The scope was then retroflexed and the fundus was visualized. The procedure was not difficult and there were no immediate complications.  There was no blood loss.    Next, A rectal exam was performed which was normal. An Olympus colonoscope was placed into the rectum and proceeded under direct visualization through the colon until the cecum and appendiceal orifice were identified. Careful visualization occurred upon slow withdraw of the scope. The scope was then retroflexed and the distal rectum was visualized. The quality of the prep was good. The procedure was not difficult and there were no immediate complications.  There was no blood loss.    EGD findings:  1.  Normal esophageal mucosa entire esophagus with no sign of hiatal hernia.  2.  Numerous gastric polyps all greater than 1 cm and some nearly 2 cm with oozing from multiple polyps and blood in the stomach, hot snare  polypectomies were performed on over 20 of the largest polyps with hemostasis achieved.  A rescue net was used to retrieve the polyps  3.  Normal duodenal mucosa visualized to D2    Colonoscopy findings:  1.  Diverticulosis was mild with small openings in the sigmoid colon  2.  A single ulcer in the transverse colon that was 4 mm and superficial, cold forcep biopsies were taken  3.  1 polyp in the transverse colon that was 5 mm and sessile removed via cold snare  4.  1 polyp in the descending colon that was 5 mm and sessile removed via cold snare      Recommendations:  Follow-up biopsy results  Recommend TIF workup with Dr. Marquez given the patient's 15 to 20-year history of PPI use and still refractory with continued GERD and heartburn.  Repeat colonoscopy in 5 years      Andres Jackson MD     Date: 12/1/2023    Time: 12:52 EST

## 2023-12-01 NOTE — H&P
GI CONSULT  NOTE:    Referring Provider:    Abraham Montoya MD Obert, Jonathan, MD    Chief complaint: <principal problem not specified>    Subjective .       Pre op diagnosis  Atypical chest pain [R07.89]  GERD (gastroesophageal reflux disease) [K21.9]  Personal history of colonic polyps [Z86.010]      History of present illness:      Enrike Price is a 57 y.o. male who presents today for Procedure(s):  ESOPHAGOGASTRODUODENOSCOPY  COLONOSCOPY for the indications listed below.     The updated Patient Profile was reviewed prior to the procedure, in conjunction with the Physical Exam, including medical conditions, surgical procedures, medications, allergies, family history and social history.     Pre-operatively, I reviewed the indication(s) for the procedure, the risks of the procedure [including but not limited to: unexpected bleeding possibly requiring hospitalization and/or unplanned repeat procedures, perforation possibly requiring surgical treatment, missed lesions and complications of sedation/MAC (also explained by anesthesia staff)].     I have evaluated the patient for risks associated with the planned anesthesia and the procedure to be performed and find the patient an acceptable candidate for IV sedation.    Multiple opportunities were provided for any questions or concerns, and all questions were answered satisfactorily before any anesthesia was administered. We will proceed with the planned procedure.    Past Medical History:  Past Medical History:   Diagnosis Date    Anxiety     Arthritis of spine     Back pain     GERD (gastroesophageal reflux disease)     Hyperlipidemia     Hypertension     IBS (irritable bowel syndrome)     PONV (postoperative nausea and vomiting)     Rash     chronic groin    Seasonal allergies     Sleep apnea, obstructive     cpap       Past Surgical History:  Past Surgical History:   Procedure Laterality Date    CARPAL TUNNEL RELEASE Bilateral 2014    CERVICAL DISC SURGERY  2015     COLONOSCOPY W/ POLYPECTOMY  07/18/2018    LUMBAR DISC SURGERY  2017    x2  2018    LUMBAR DISCECTOMY FUSION INSTRUMENTATION Right 09/13/2017    not fusion       Social History:  Social History     Tobacco Use    Smoking status: Never    Smokeless tobacco: Never   Vaping Use    Vaping Use: Never used   Substance Use Topics    Alcohol use: No    Drug use: No       Family History:  Family History   Problem Relation Age of Onset    No Known Problems Mother     No Known Problems Father     No Known Problems Sister     Laure Hyperthermia Neg Hx        Medications:  Medications Prior to Admission   Medication Sig Dispense Refill Last Dose    busPIRone (BUSPAR) 5 MG tablet Take 1.5-3 tablets by mouth Daily.   11/30/2023    Chlorcyclizine-Pseudoephed (Stahist AD) 25-60 MG tablet Take 1 tablet by mouth Daily As Needed.   11/30/2023    clotrimazole (LOTRIMIN) 1 % cream 1 application Daily. Groin  combined with desowem       desonide (DESOWEN) 0.05 % cream 1 application Daily As Needed. Use as directed  combined with lotrimin       dicyclomine (BENTYL) 20 MG tablet Take 1 tablet by mouth Every 6 (Six) Hours As Needed.   11/30/2023    esomeprazole (nexIUM) 40 MG capsule Take 1 capsule by mouth Every Morning Before Breakfast.   11/30/2023    ibuprofen (ADVIL,MOTRIN) 200 MG tablet Take 3 tablets by mouth Every 6 (Six) Hours As Needed for Mild Pain.   Past Month    lisinopril-hydrochlorothiazide (PRINZIDE,ZESTORETIC) 10-12.5 MG per tablet Take 1 tablet by mouth Daily.   11/30/2023    methscopolamine (PAMINE FORTE) 5 MG tablet Take 0.5 tablets by mouth Daily As Needed.   11/30/2023    simvastatin (ZOCOR) 40 MG tablet Take 1 tablet by mouth Daily.   11/30/2023    busPIRone (BUSPAR) 15 MG tablet Take 1 tablet by mouth Every Night.       hydrocortisone 2.5 % cream MIX WITH CLOTRIMAZOLE AND APPLY TWICE DAILY       levocetirizine (XYZAL) 5 MG tablet Take 1 tablet by mouth Daily As Needed.   Unknown    mometasone (NASONEX) 50 MCG/ACT  "nasal spray 2 sprays into the nostril(s) as directed by provider Daily As Needed.   Unknown    pantoprazole (PROTONIX) 40 MG EC tablet Take 1 tablet by mouth Daily.          Scheduled Meds:  Continuous Infusions:No current facility-administered medications for this encounter.    PRN Meds:.    ALLERGIES:  Aspirin and Baclofen    ROS:  The following systems were reviewed and negative;  Constitution:  No fevers, chills, no unintentional weight loss  Skin: no rash, no jaundice  Eyes:  No blurry vision, no eye pain  HENT:  No change in hearing or smell  Resp:  No dyspnea or cough  CV:  No chest pain or palpitations  :  No dysuria, hematuria  Musculoskeletal:  No leg cramps or arthralgias  Neuro:  No tremor, no numbness  Psych:  No depression or confsusion    Objective     Vital Signs:   Vitals:    06/02/23 1438 11/21/23 1019 12/01/23 1100   BP:   133/81   BP Location:   Right arm   Patient Position:   Lying   Pulse:   93   Resp:   14   Temp:   98.2 °F (36.8 °C)   TempSrc:   Oral   SpO2:   99%   Weight: 104 kg (230 lb) 104 kg (230 lb) 107 kg (234 lb 12.6 oz)   Height: 172.7 cm (68\") 172.7 cm (68\") 172.7 cm (68\")       Physical Exam:       General Appearance:    Awake and alert, in no acute distress   Head:    Normocephalic, without obvious abnormality, atraumatic   Throat:   No oral lesions, no thrush, oral mucosa moist   Lungs:     respirations regular, even and unlabored   Skin:   No rash, no jaundice       Results Review:  Lab Results (last 24 hours)       ** No results found for the last 24 hours. **            Imaging Results (Last 24 Hours)       ** No results found for the last 24 hours. **             I reviewed the patient's labs and imaging.    ASSESSMENT AND PLAN:      Active Problems:    * No active hospital problems. *       Procedure(s):  ESOPHAGOGASTRODUODENOSCOPY  COLONOSCOPY      I discussed the patient's findings and my recommendations with the patient.    Andres Jackson MD  12/01/23  12:30 " EST

## 2023-12-04 LAB
LAB AP CASE REPORT: NORMAL
PATH REPORT.FINAL DX SPEC: NORMAL
PATH REPORT.GROSS SPEC: NORMAL

## 2024-01-04 ENCOUNTER — OFFICE (OUTPATIENT)
Dept: URBAN - METROPOLITAN AREA CLINIC 64 | Facility: CLINIC | Age: 59
End: 2024-01-04

## 2024-01-04 VITALS
HEART RATE: 90 BPM | DIASTOLIC BLOOD PRESSURE: 73 MMHG | SYSTOLIC BLOOD PRESSURE: 114 MMHG | HEIGHT: 68 IN | WEIGHT: 237 LBS

## 2024-01-04 DIAGNOSIS — R07.89 OTHER CHEST PAIN: ICD-10-CM

## 2024-01-04 DIAGNOSIS — E66.9 OBESITY, UNSPECIFIED: ICD-10-CM

## 2024-01-04 DIAGNOSIS — K21.9 GASTRO-ESOPHAGEAL REFLUX DISEASE WITHOUT ESOPHAGITIS: ICD-10-CM

## 2024-01-04 PROCEDURE — 99214 OFFICE O/P EST MOD 30 MIN: CPT | Performed by: INTERNAL MEDICINE

## 2024-01-04 RX ORDER — OMEPRAZOLE 40 MG/1
40 CAPSULE, DELAYED RELEASE ORAL
Qty: 90 | Refills: 3 | Status: COMPLETED
Start: 2024-01-04 | End: 2024-03-28

## 2024-03-28 ENCOUNTER — OFFICE (OUTPATIENT)
Dept: URBAN - METROPOLITAN AREA CLINIC 64 | Facility: CLINIC | Age: 59
End: 2024-03-28
Payer: COMMERCIAL

## 2024-03-28 VITALS
DIASTOLIC BLOOD PRESSURE: 64 MMHG | WEIGHT: 221 LBS | HEART RATE: 78 BPM | SYSTOLIC BLOOD PRESSURE: 102 MMHG | HEIGHT: 68 IN

## 2024-03-28 DIAGNOSIS — K21.9 GASTRO-ESOPHAGEAL REFLUX DISEASE WITHOUT ESOPHAGITIS: ICD-10-CM

## 2024-03-28 PROCEDURE — 99213 OFFICE O/P EST LOW 20 MIN: CPT | Performed by: INTERNAL MEDICINE

## 2024-03-28 RX ORDER — FAMOTIDINE 40 MG/1
TABLET, FILM COATED ORAL
Qty: 30 | Refills: 10 | Status: ACTIVE
Start: 2024-03-28

## 2024-09-30 ENCOUNTER — OFFICE (OUTPATIENT)
Age: 59
End: 2024-09-30
Payer: COMMERCIAL

## 2024-09-30 ENCOUNTER — OFFICE (OUTPATIENT)
Dept: URBAN - METROPOLITAN AREA CLINIC 64 | Facility: CLINIC | Age: 59
End: 2024-09-30
Payer: COMMERCIAL

## 2024-09-30 VITALS
HEART RATE: 82 BPM | SYSTOLIC BLOOD PRESSURE: 108 MMHG | DIASTOLIC BLOOD PRESSURE: 60 MMHG | HEIGHT: 68 IN | HEART RATE: 82 BPM | DIASTOLIC BLOOD PRESSURE: 60 MMHG | HEART RATE: 82 BPM | WEIGHT: 232 LBS | WEIGHT: 232 LBS | DIASTOLIC BLOOD PRESSURE: 60 MMHG | WEIGHT: 232 LBS | SYSTOLIC BLOOD PRESSURE: 108 MMHG | HEART RATE: 82 BPM | HEIGHT: 68 IN | DIASTOLIC BLOOD PRESSURE: 60 MMHG | HEIGHT: 68 IN | HEART RATE: 82 BPM | SYSTOLIC BLOOD PRESSURE: 108 MMHG | SYSTOLIC BLOOD PRESSURE: 108 MMHG | HEIGHT: 68 IN | SYSTOLIC BLOOD PRESSURE: 108 MMHG | WEIGHT: 232 LBS | DIASTOLIC BLOOD PRESSURE: 60 MMHG | WEIGHT: 232 LBS | DIASTOLIC BLOOD PRESSURE: 60 MMHG | HEART RATE: 82 BPM | HEIGHT: 68 IN | HEIGHT: 68 IN | DIASTOLIC BLOOD PRESSURE: 60 MMHG | WEIGHT: 232 LBS | HEART RATE: 82 BPM | HEIGHT: 68 IN | SYSTOLIC BLOOD PRESSURE: 108 MMHG | SYSTOLIC BLOOD PRESSURE: 108 MMHG | WEIGHT: 232 LBS

## 2024-09-30 DIAGNOSIS — E66.9 OBESITY, UNSPECIFIED: ICD-10-CM

## 2024-09-30 DIAGNOSIS — K31.7 POLYP OF STOMACH AND DUODENUM: ICD-10-CM

## 2024-09-30 DIAGNOSIS — K21.9 GASTRO-ESOPHAGEAL REFLUX DISEASE WITHOUT ESOPHAGITIS: ICD-10-CM

## 2024-09-30 PROCEDURE — 99213 OFFICE O/P EST LOW 20 MIN: CPT | Performed by: INTERNAL MEDICINE

## 2024-09-30 RX ORDER — VONOPRAZAN FUMARATE 13.36 MG/1
10 TABLET ORAL
Qty: 90 | Refills: 4 | Status: ACTIVE
Start: 2024-09-30

## 2025-04-21 ENCOUNTER — HOSPITAL ENCOUNTER (EMERGENCY)
Facility: HOSPITAL | Age: 60
Discharge: HOME | End: 2025-04-21
Attending: EMERGENCY MEDICINE | Admitting: EMERGENCY MEDICINE
Payer: COMMERCIAL

## 2025-04-21 VITALS
RESPIRATION RATE: 16 BRPM | BODY MASS INDEX: 33.61 KG/M2 | HEIGHT: 68 IN | SYSTOLIC BLOOD PRESSURE: 150 MMHG | HEART RATE: 91 BPM | OXYGEN SATURATION: 95 % | DIASTOLIC BLOOD PRESSURE: 83 MMHG | WEIGHT: 221.78 LBS | TEMPERATURE: 98.6 F

## 2025-04-21 DIAGNOSIS — A08.11 NOROVIRUS: Primary | ICD-10-CM

## 2025-04-21 DIAGNOSIS — R19.7 DIARRHEA, UNSPECIFIED TYPE: ICD-10-CM

## 2025-04-21 LAB
ADV 40+41 DNA STL QL NAA+NON-PROBE: NOT DETECTED
ALBUMIN SERPL-MCNC: 2.8 G/DL (ref 3.5–5.2)
ALBUMIN/GLOB SERPL: 0.7 G/DL
ALP SERPL-CCNC: 68 U/L (ref 39–117)
ALT SERPL W P-5'-P-CCNC: 84 U/L (ref 1–41)
ANION GAP SERPL CALCULATED.3IONS-SCNC: 14.8 MMOL/L (ref 5–15)
AST SERPL-CCNC: 41 U/L (ref 1–40)
ASTRO TYP 1-8 RNA STL QL NAA+NON-PROBE: NOT DETECTED
BACTERIA UR QL AUTO: NORMAL /HPF
BASOPHILS # BLD AUTO: 0.03 10*3/MM3 (ref 0–0.2)
BASOPHILS NFR BLD AUTO: 0.3 % (ref 0–1.5)
BILIRUB SERPL-MCNC: 1 MG/DL (ref 0–1.2)
BILIRUB UR QL STRIP: NEGATIVE
BUN SERPL-MCNC: 11 MG/DL (ref 6–20)
BUN/CREAT SERPL: 11.6 (ref 7–25)
C CAYETANENSIS DNA STL QL NAA+NON-PROBE: NOT DETECTED
C COLI+JEJ+UPSA DNA STL QL NAA+NON-PROBE: NOT DETECTED
CALCIUM SPEC-SCNC: 7.8 MG/DL (ref 8.6–10.5)
CHLORIDE SERPL-SCNC: 104 MMOL/L (ref 98–107)
CLARITY UR: CLEAR
CO2 SERPL-SCNC: 20.2 MMOL/L (ref 22–29)
COLOR UR: YELLOW
CREAT SERPL-MCNC: 0.95 MG/DL (ref 0.76–1.27)
CRYPTOSP DNA STL QL NAA+NON-PROBE: NOT DETECTED
DEPRECATED RDW RBC AUTO: 49.9 FL (ref 37–54)
E HISTOLYT DNA STL QL NAA+NON-PROBE: NOT DETECTED
EAEC PAA PLAS AGGR+AATA ST NAA+NON-PRB: NOT DETECTED
EC STX1+STX2 GENES STL QL NAA+NON-PROBE: NOT DETECTED
EGFRCR SERPLBLD CKD-EPI 2021: 93.4 ML/MIN/1.73
EOSINOPHIL # BLD AUTO: 0.09 10*3/MM3 (ref 0–0.4)
EOSINOPHIL NFR BLD AUTO: 0.9 % (ref 0.3–6.2)
EPEC EAE GENE STL QL NAA+NON-PROBE: NOT DETECTED
ERYTHROCYTE [DISTWIDTH] IN BLOOD BY AUTOMATED COUNT: 15.8 % (ref 12.3–15.4)
ETEC LTA+ST1A+ST1B TOX ST NAA+NON-PROBE: NOT DETECTED
G LAMBLIA DNA STL QL NAA+NON-PROBE: NOT DETECTED
GLOBULIN UR ELPH-MCNC: 4.3 GM/DL
GLUCOSE SERPL-MCNC: 92 MG/DL (ref 65–99)
GLUCOSE UR STRIP-MCNC: NEGATIVE MG/DL
HCT VFR BLD AUTO: 45.6 % (ref 37.5–51)
HGB BLD-MCNC: 14.5 G/DL (ref 13–17.7)
HGB UR QL STRIP.AUTO: ABNORMAL
HYALINE CASTS UR QL AUTO: NORMAL /LPF
IMM GRANULOCYTES # BLD AUTO: 0.07 10*3/MM3 (ref 0–0.05)
IMM GRANULOCYTES NFR BLD AUTO: 0.7 % (ref 0–0.5)
KETONES UR QL STRIP: NEGATIVE
LEUKOCYTE ESTERASE UR QL STRIP.AUTO: ABNORMAL
LIPASE SERPL-CCNC: 14 U/L (ref 13–60)
LYMPHOCYTES # BLD AUTO: 1.64 10*3/MM3 (ref 0.7–3.1)
LYMPHOCYTES NFR BLD AUTO: 15.7 % (ref 19.6–45.3)
MCH RBC QN AUTO: 27.7 PG (ref 26.6–33)
MCHC RBC AUTO-ENTMCNC: 31.8 G/DL (ref 31.5–35.7)
MCV RBC AUTO: 87 FL (ref 79–97)
MONOCYTES # BLD AUTO: 1.04 10*3/MM3 (ref 0.1–0.9)
MONOCYTES NFR BLD AUTO: 10 % (ref 5–12)
NEUTROPHILS NFR BLD AUTO: 7.55 10*3/MM3 (ref 1.7–7)
NEUTROPHILS NFR BLD AUTO: 72.4 % (ref 42.7–76)
NITRITE UR QL STRIP: NEGATIVE
NOROVIRUS GI+II RNA STL QL NAA+NON-PROBE: DETECTED
NRBC BLD AUTO-RTO: 0 /100 WBC (ref 0–0.2)
P SHIGELLOIDES DNA STL QL NAA+NON-PROBE: NOT DETECTED
PH UR STRIP.AUTO: 5.5 [PH] (ref 5–8)
PLATELET # BLD AUTO: 254 10*3/MM3 (ref 140–450)
PMV BLD AUTO: 9.2 FL (ref 6–12)
POTASSIUM SERPL-SCNC: 3.8 MMOL/L (ref 3.5–5.2)
PROT SERPL-MCNC: 7.1 G/DL (ref 6–8.5)
PROT UR QL STRIP: NEGATIVE
RBC # BLD AUTO: 5.24 10*6/MM3 (ref 4.14–5.8)
RBC # UR STRIP: NORMAL /HPF
REF LAB TEST METHOD: NORMAL
RVA RNA STL QL NAA+NON-PROBE: NOT DETECTED
S ENT+BONG DNA STL QL NAA+NON-PROBE: NOT DETECTED
SAPO I+II+IV+V RNA STL QL NAA+NON-PROBE: NOT DETECTED
SHIGELLA SP+EIEC IPAH ST NAA+NON-PROBE: NOT DETECTED
SODIUM SERPL-SCNC: 139 MMOL/L (ref 136–145)
SP GR UR STRIP: 1.01 (ref 1–1.03)
SQUAMOUS #/AREA URNS HPF: NORMAL /HPF
UROBILINOGEN UR QL STRIP: ABNORMAL
V CHOL+PARA+VUL DNA STL QL NAA+NON-PROBE: NOT DETECTED
V CHOLERAE DNA STL QL NAA+NON-PROBE: NOT DETECTED
WBC # UR STRIP: NORMAL /HPF
WBC NRBC COR # BLD AUTO: 10.42 10*3/MM3 (ref 3.4–10.8)
Y ENTEROCOL DNA STL QL NAA+NON-PROBE: NOT DETECTED

## 2025-04-21 PROCEDURE — 85025 COMPLETE CBC W/AUTO DIFF WBC: CPT | Performed by: EMERGENCY MEDICINE

## 2025-04-21 PROCEDURE — 80053 COMPREHEN METABOLIC PANEL: CPT | Performed by: EMERGENCY MEDICINE

## 2025-04-21 PROCEDURE — 99283 EMERGENCY DEPT VISIT LOW MDM: CPT

## 2025-04-21 PROCEDURE — 96374 THER/PROPH/DIAG INJ IV PUSH: CPT

## 2025-04-21 PROCEDURE — 25810000003 LACTATED RINGERS SOLUTION: Performed by: EMERGENCY MEDICINE

## 2025-04-21 PROCEDURE — 81001 URINALYSIS AUTO W/SCOPE: CPT | Performed by: EMERGENCY MEDICINE

## 2025-04-21 PROCEDURE — 83690 ASSAY OF LIPASE: CPT | Performed by: EMERGENCY MEDICINE

## 2025-04-21 PROCEDURE — 87507 IADNA-DNA/RNA PROBE TQ 12-25: CPT | Performed by: EMERGENCY MEDICINE

## 2025-04-21 PROCEDURE — 25010000002 ONDANSETRON PER 1 MG: Performed by: EMERGENCY MEDICINE

## 2025-04-21 RX ORDER — ONDANSETRON 2 MG/ML
4 INJECTION INTRAMUSCULAR; INTRAVENOUS ONCE
Status: COMPLETED | OUTPATIENT
Start: 2025-04-21 | End: 2025-04-21

## 2025-04-21 RX ORDER — ONDANSETRON 4 MG/1
4 TABLET, ORALLY DISINTEGRATING ORAL EVERY 8 HOURS PRN
Qty: 12 TABLET | Refills: 0 | Status: SHIPPED | OUTPATIENT
Start: 2025-04-21

## 2025-04-21 RX ORDER — SODIUM CHLORIDE 0.9 % (FLUSH) 0.9 %
10 SYRINGE (ML) INJECTION AS NEEDED
Status: DISCONTINUED | OUTPATIENT
Start: 2025-04-21 | End: 2025-04-21 | Stop reason: HOSPADM

## 2025-04-21 RX ADMIN — SODIUM CHLORIDE, POTASSIUM CHLORIDE, SODIUM LACTATE AND CALCIUM CHLORIDE 1000 ML: 600; 310; 30; 20 INJECTION, SOLUTION INTRAVENOUS at 03:00

## 2025-04-21 RX ADMIN — ONDANSETRON 4 MG: 2 INJECTION, SOLUTION INTRAMUSCULAR; INTRAVENOUS at 02:59

## 2025-04-21 NOTE — ED PROVIDER NOTES
"Subjective   History of Present Illness  Patient is a 59-year-old male with a history of irritable bowel syndrome (IBS) who presents with four days of diarrhea. He reports having 5-6 episodes per day, sometimes more, and notes that consuming food or drink exacerbates the diarrhea. The diarrhea is described as watery. He denies vomiting, which he typically experiences during IBS exacerbations, and reports mild abdominal cramping and a sensation of rawness in the abdomen. He has not had any recent antibiotic use or new medications. No melena.  Review of Systems  See HPI.  Past Medical History:   Diagnosis Date    Anxiety     Arthritis of spine     Back pain     GERD (gastroesophageal reflux disease)     Hyperlipidemia     Hypertension     IBS (irritable bowel syndrome)     PONV (postoperative nausea and vomiting)     Rash     chronic groin    Seasonal allergies     Sleep apnea, obstructive     cpap       Allergies   Allergen Reactions    Aspirin GI Intolerance and Nausea And Vomiting     Other reaction(s): GI Intolerance    Other reaction(s): GI Intolerance      <paragraph>Other reaction(s): GI Intolerance</paragraph>    Baclofen Other (See Comments) and Rash     Per patient caused nausea and vomiting, diarrhea, and \"jerking motions with hands/dropping items\"    Per patient symptoms resolved when stopped taking baclofen    Other reaction(s): Other (See Comments)   Per patient caused nausea and vomiting, diarrhea, and \"jerking motions with hands/dropping items\"   Per patient symptoms resolved when stopped taking baclofen   Per patient caused nausea and vomiting, diarrhea, and \"jerking motions with hands/dropping items\"   Per patient symptoms resolved when stopped taking baclofen    Per patient caused nausea and vomiting, diarrhea, and \"jerking motions with hands/dropping items\"      Per patient symptoms resolved when stopped taking baclofen      Other reaction(s): Other (See Comments)      <paragraph>Other reaction(s): " "Other (See Comments)</paragraph><paragraph>Per patient caused nausea and vomiting, diarrhea, and \"jerking motions with hands/dropping items\"</paragraph><paragraph>Per patient symptoms resolved when stopped taking baclofen</paragraph><paragraph>Per patient caused nausea and vomiting, diarrhea, and \"jerking motions with hands/dropping items\"</paragraph><paragraph>Per patient symptoms resolved when stopped taking baclofen</paragraph><paragraph> </paragraph>      <paragraph>Per patient caused nausea and vomiting, diarrhea, and \"jerking motions with hands/dropping items\"</paragraph><paragraph>Per patient symptoms resolved when stopped taking baclofen</paragraph>       Past Surgical History:   Procedure Laterality Date    CARPAL TUNNEL RELEASE Bilateral 2014    CERVICAL DISC SURGERY  2015    COLONOSCOPY N/A 12/01/2023    Procedure: COLONOSCOPY with polypectomy x 2 and biopsy x 1 area;  Surgeon: Andres Jackson MD;  Location: [facility];  Service: Gastroenterology;  Laterality: N/A;  post op: transverse colon ulcer, polyps    COLONOSCOPY W/ POLYPECTOMY  07/18/2018    ENDOSCOPY N/A 12/01/2023    Procedure: ESOPHAGOGASTRODUODENOSCOPY with polypectomies;  Surgeon: Andres Jackson MD;  Location: [facility];  Service: Gastroenterology;  Laterality: N/A;  post op: gastric polyps    FOOT FRACTURE SURGERY      LUMBAR DISC SURGERY  2017    x2  2018    LUMBAR DISCECTOMY FUSION INSTRUMENTATION Right 09/13/2017    not fusion       Family History   Problem Relation Age of Onset    No Known Problems Mother     No Known Problems Father     No Known Problems Sister     Malig Hyperthermia Neg Hx        Social History     Socioeconomic History    Marital status:     Number of children: 2    Years of education: [employer]   Tobacco Use    Smoking status: Never    Smokeless tobacco: Never   Vaping Use    Vaping status: Never Used   Substance and Sexual Activity    Alcohol use: No    Drug use: No    Sexual activity: " "Defer           Objective   Physical Exam  No acute distress. Normocephalic. No scleral icterus. Moist oral mucosa. No observable neck masses on external visualization. No respiratory distress. No tachypnea or increased work of breathing. Normal heart rate. Intact distal pulses. Abdomen soft and nontender without peritoneal signs. Alert. Normal speech.  Procedures           ED Course      /83 (BP Location: Left arm, Patient Position: Sitting)   Pulse 91   Temp 98.6 °F (37 °C) (Oral)   Resp 16   Ht 172.7 cm (68\")   Wt 101 kg (221 lb 12.5 oz)   SpO2 95%   BMI 33.72 kg/m²   Labs Reviewed   GASTROINTESTINAL PANEL, PCR (PREFERRED) DOES NOT INCLUDE CDIFF - Abnormal; Notable for the following components:       Result Value    Norovirus GI/GII Detected (*)     All other components within normal limits   COMPREHENSIVE METABOLIC PANEL - Abnormal; Notable for the following components:    CO2 20.2 (*)     Calcium 7.8 (*)     Albumin 2.8 (*)     ALT (SGPT) 84 (*)     AST (SGOT) 41 (*)     All other components within normal limits    Narrative:     GFR Categories in Chronic Kidney Disease (CKD)      GFR Category          GFR (mL/min/1.73)    Interpretation  G1                     90 or greater         Normal or high (1)  G2                      60-89                Mild decrease (1)  G3a                   45-59                Mild to moderate decrease  G3b                   30-44                Moderate to severe decrease  G4                    15-29                Severe decrease  G5                    14 or less           Kidney failure          (1)In the absence of evidence of kidney disease, neither GFR category G1 or G2 fulfill the criteria for CKD.    eGFR calculation 2021 CKD-EPI creatinine equation, which does not include race as a factor   URINALYSIS W/ MICROSCOPIC IF INDICATED (NO CULTURE) - Abnormal; Notable for the following components:    Blood, UA Small (1+) (*)     Leuk Esterase, UA Trace (*)     All " other components within normal limits   CBC WITH AUTO DIFFERENTIAL - Abnormal; Notable for the following components:    RDW 15.8 (*)     Lymphocyte % 15.7 (*)     Immature Grans % 0.7 (*)     Neutrophils, Absolute 7.55 (*)     Monocytes, Absolute 1.04 (*)     Immature Grans, Absolute 0.07 (*)     All other components within normal limits   LIPASE - Normal   URINALYSIS, MICROSCOPIC ONLY   CBC AND DIFFERENTIAL    Narrative:     The following orders were created for panel order CBC & Differential.  Procedure                               Abnormality         Status                     ---------                               -----------         ------                     CBC Auto Differential[[id number]]        Abnormal            Final result                 Please view results for these tests on the individual orders.     Medications   lactated ringers bolus 1,000 mL (0 mL Intravenous Stopped 4/21/25 7758)   ondansetron (ZOFRAN) injection 4 mg (4 mg Intravenous Given 4/21/25 9982)     No orders to display                                                      Medical Decision Making  Problems Addressed:  Diarrhea, unspecified type: complicated acute illness or injury  Norovirus: complicated acute illness or injury    Amount and/or Complexity of Data Reviewed  Labs: ordered.    Risk  Prescription drug management.    Benign abdominal exam.  Reassuring white count without significant shift in CBC differential.  UA unremarkable.  Mildly elevated liver enzymes.  GI panel positive for norovirus.  Do not believe emergent imaging indicated.  Discussed expected course and home care.  Jaswant BLANKENSHIP.    Final diagnoses:   Norovirus   Diarrhea, unspecified type       ED Disposition  ED Disposition       ED Disposition   Discharge    Condition   Stable    Comment   --               Abraham Montoya MD  [address] [place] [address] [place] [id number]  [phone]    In 3 days           Medication List        New Prescriptions      ondansetron ODT 4  MG disintegrating tablet  Commonly known as: ZOFRAN-ODT  Place 1 tablet on the tongue Every 8 (Eight) Hours As Needed for Nausea or Vomiting.               Where to Get Your Medications        These medications were sent to [facility] [id number] - [place], [place] - [address] - [phone]  - [phone] FX  [address] [place] [address]      Phone: [phone]   ondansetron ODT 4 MG disintegrating tablet            Moisés Hansen MD  04/21/25 0758

## 2025-04-29 ENCOUNTER — OFFICE (OUTPATIENT)
Dept: URBAN - METROPOLITAN AREA CLINIC 64 | Facility: CLINIC | Age: 60
End: 2025-04-29
Payer: COMMERCIAL

## 2025-04-29 VITALS
HEIGHT: 68 IN | DIASTOLIC BLOOD PRESSURE: 76 MMHG | SYSTOLIC BLOOD PRESSURE: 127 MMHG | WEIGHT: 225 LBS | HEART RATE: 74 BPM

## 2025-04-29 DIAGNOSIS — K58.9 IRRITABLE BOWEL SYNDROME, UNSPECIFIED: ICD-10-CM

## 2025-04-29 DIAGNOSIS — K21.9 GASTRO-ESOPHAGEAL REFLUX DISEASE WITHOUT ESOPHAGITIS: ICD-10-CM

## 2025-04-29 PROCEDURE — 99214 OFFICE O/P EST MOD 30 MIN: CPT | Performed by: NURSE PRACTITIONER

## (undated) DEVICE — UNDYED BRAIDED (POLYGLACTIN 910), SYNTHETIC ABSORBABLE SUTURE: Brand: COATED VICRYL

## (undated) DEVICE — APPL CHLORAPREP W/TINT 26ML ORNG

## (undated) DEVICE — ADHS SKIN DERMABOND TOP ADVANCED

## (undated) DEVICE — ADHS SKIN DERMABOND

## (undated) DEVICE — NET RESCUENET F/B RETRV

## (undated) DEVICE — OIL CARTRIDGE: Brand: CORE, MAESTRO

## (undated) DEVICE — SPNG GZ WOVN 4X4IN 12PLY 10/BX STRL

## (undated) DEVICE — CODMAN® SURGICAL PATTIES 3/4" X 3/4" (1.91CM X 1.91CM): Brand: CODMAN®

## (undated) DEVICE — SYR CONTRL LUERLOK 10CC

## (undated) DEVICE — 6.0MM PRECISION ROUND

## (undated) DEVICE — DRP MICROSCP LEICA W/GLASS LENS

## (undated) DEVICE — ERBE NESSY®PLATE 170 SPLIT; 168CM²; CABLE 3M: Brand: ERBE

## (undated) DEVICE — SMOKE EVACUATION TUBING WITH 7/8 IN TO 1/4 IN REDUCER: Brand: BUFFALO FILTER

## (undated) DEVICE — SHEET, DRAPE, SPLIT, STERILE: Brand: MEDLINE

## (undated) DEVICE — SINGLE-USE BIOPSY FORCEPS: Brand: RADIAL JAW 4

## (undated) DEVICE — Device

## (undated) DEVICE — BITEBLOCK ENDO W/STRAP 60F A/ LF DISP

## (undated) DEVICE — PK NEURO SPINE 40

## (undated) DEVICE — GLV SURG BIOGEL LTX PF 7

## (undated) DEVICE — TRAP WIDEEYE POLYP

## (undated) DEVICE — SNAR POLYP HOTSNARE/BRAIDED OVL/MINI 7F 2.8X10MM 230CM 1P/U

## (undated) DEVICE — FLOSEAL MATRIX IS INDICATED IN SURGICAL PROCEDURES (OTHER THAN IN OPHTHALMIC) AS AN ADJUNCT TO HEMOSTASIS WHEN CONTROL OF BLEEDING BY LIGATURE OR CONVENTIONAL PROCEDURES IS INEFFECTIVE OR IMPRACTICAL.: Brand: FLOSEAL HEMOSTATIC MATRIX

## (undated) DEVICE — GLV SURG SENSICARE W/ALOE PF LF 7.5 STRL

## (undated) DEVICE — NDL SPINE 20G 3 1/2 YEL STRL 1P/U

## (undated) DEVICE — SOL NACL 0.9PCT 100ML SGL

## (undated) DEVICE — PK ENDO GI 50

## (undated) DEVICE — DIFFUSER: Brand: CORE, MAESTRO

## (undated) DEVICE — CONN TBG Y 5 IN 1 LF STRL